# Patient Record
Sex: FEMALE | Race: WHITE | NOT HISPANIC OR LATINO | Employment: FULL TIME | URBAN - METROPOLITAN AREA
[De-identification: names, ages, dates, MRNs, and addresses within clinical notes are randomized per-mention and may not be internally consistent; named-entity substitution may affect disease eponyms.]

---

## 2018-11-15 ENCOUNTER — APPOINTMENT (OUTPATIENT)
Dept: WOMENS IMAGING | Facility: HOSPITAL | Age: 58
End: 2018-11-15

## 2018-11-15 PROCEDURE — 77067 SCR MAMMO BI INCL CAD: CPT | Performed by: RADIOLOGY

## 2019-03-21 ENCOUNTER — OFFICE VISIT (OUTPATIENT)
Dept: GASTROENTEROLOGY | Facility: CLINIC | Age: 59
End: 2019-03-21

## 2019-03-21 VITALS
HEIGHT: 64 IN | TEMPERATURE: 97.8 F | WEIGHT: 154.21 LBS | DIASTOLIC BLOOD PRESSURE: 66 MMHG | BODY MASS INDEX: 26.33 KG/M2 | SYSTOLIC BLOOD PRESSURE: 98 MMHG

## 2019-03-21 DIAGNOSIS — Z12.11 SCREENING FOR COLORECTAL CANCER: ICD-10-CM

## 2019-03-21 DIAGNOSIS — K62.5 RECTAL BLEEDING: Primary | ICD-10-CM

## 2019-03-21 DIAGNOSIS — Z12.12 SCREENING FOR COLORECTAL CANCER: ICD-10-CM

## 2019-03-21 PROCEDURE — 99204 OFFICE O/P NEW MOD 45 MIN: CPT | Performed by: INTERNAL MEDICINE

## 2019-03-21 RX ORDER — ACETAMINOPHEN 500 MG
500 TABLET ORAL EVERY 6 HOURS PRN
COMMUNITY

## 2019-03-21 NOTE — PROGRESS NOTES
Chief Complaint   Patient presents with   • Rectal Bleeding   • Abnormal Lab     postive cologard        Mckenzie Kinsey is a  59 y.o. female here for an initial visit for rectal bleeding and positive [place] guard test    HPI this 59-year-old white female patient of Dr. Ken Delacruz presents after testing positive on [place] guard examination.  She had been advised by her gynecologist at [facility] to undergo more formal examination on the basis of this testing.  She does complain of internal hemorrhoids that have been an issue for the past year and seem to have exacerbated in the recent past.  Her normal bowel pattern is 1 stool per day.  She admits to weight gain of approximately 12 pounds over the past 6 months.  Family history is negative for colorectal cancer.  She denies any significant upper GI complaints.    Past Medical History:   Diagnosis Date   • Fibromyalgia        Current Outpatient Medications   Medication Sig Dispense Refill   • acetaminophen (TYLENOL) 500 MG tablet Take 500 mg by mouth Every 6 (Six) Hours As Needed for Mild Pain .     • IBUPROFEN PO Take 200 mg by mouth 2 (Two) Times a Day As Needed.       No current facility-administered medications for this visit.        PRN Meds:.    No Known Allergies    Social History     Socioeconomic History   • Marital status: Unknown     Spouse name: Not on file   • Number of children: Not on file   • Years of education: Not on file   • Highest education level: Not on file   Tobacco Use   • Smoking status: Current Every Day Smoker   • Smokeless tobacco: Never Used       History reviewed. No pertinent family history.    Review of Systems   Constitutional: Negative for activity change, appetite change, fatigue and unexpected weight change.   HENT: Negative for congestion, facial swelling, sore throat, trouble swallowing and voice change.    Eyes: Negative for photophobia and visual disturbance.   Respiratory: Negative for cough and choking.     Cardiovascular: Negative for chest pain.   Gastrointestinal: Positive for rectal pain. Negative for abdominal distention, abdominal pain, anal bleeding, blood in stool, constipation, diarrhea, nausea and vomiting.   Endocrine: Negative for polyphagia.   Musculoskeletal: Negative for arthralgias, gait problem and joint swelling.   Skin: Negative for color change, pallor and rash.   Allergic/Immunologic: Negative for food allergies.   Neurological: Negative for speech difficulty and headaches.   Hematological: Does not bruise/bleed easily.   Psychiatric/Behavioral: Negative for agitation, confusion and sleep disturbance.       Vitals:    03/21/19 1530   BP: 98/66   Temp: 97.8 °F (36.6 °C)       Physical Exam   Constitutional: She is oriented to person, place, and time. She appears well-developed and well-nourished. No distress.   HENT:   Head: Normocephalic.   Mouth/Throat: Oropharynx is clear and moist. No oropharyngeal exudate.   Eyes: Conjunctivae and EOM are normal. No scleral icterus.   Neck: Normal range of motion. No thyromegaly present.   Cardiovascular: Normal rate and regular rhythm.   No murmur heard.  Pulmonary/Chest: Breath sounds normal. No respiratory distress. She has no wheezes. She has no rales.   Abdominal: Soft. Bowel sounds are normal. She exhibits no distension and no mass. There is no hepatosplenomegaly. There is no tenderness.   Musculoskeletal: Normal range of motion. She exhibits no edema or tenderness.   Lymphadenopathy:     She has no cervical adenopathy.   Neurological: She is alert and oriented to person, place, and time.   Skin: Skin is warm and dry. No rash noted. She is not diaphoretic. No erythema.   Psychiatric: She has a normal mood and affect. Her behavior is normal.   Vitals reviewed.      ASSESSMENT  #1 [place] guard positive: Concerned with either polyps or neoplasia  #2 internal hemorrhoids      PLAN  Schedule colonoscopy  Analpram with hydrocortisone 2.5% cream      ICD-10-CM  ICD-9-CM   1. Rectal bleeding K62.5 569.3

## 2019-03-25 PROBLEM — Z12.11 SCREENING FOR COLORECTAL CANCER: Status: ACTIVE | Noted: 2019-03-25

## 2019-03-25 PROBLEM — Z12.12 SCREENING FOR COLORECTAL CANCER: Status: ACTIVE | Noted: 2019-03-25

## 2020-01-15 ENCOUNTER — OFFICE VISIT (OUTPATIENT)
Dept: FAMILY MEDICINE CLINIC | Facility: CLINIC | Age: 60
End: 2020-01-15

## 2020-01-15 VITALS
HEIGHT: 64 IN | DIASTOLIC BLOOD PRESSURE: 60 MMHG | TEMPERATURE: 98.5 F | HEART RATE: 88 BPM | OXYGEN SATURATION: 99 % | RESPIRATION RATE: 16 BRPM | WEIGHT: 152 LBS | SYSTOLIC BLOOD PRESSURE: 110 MMHG | BODY MASS INDEX: 25.95 KG/M2

## 2020-01-15 DIAGNOSIS — R19.7 DIARRHEA, UNSPECIFIED TYPE: ICD-10-CM

## 2020-01-15 DIAGNOSIS — F41.9 ANXIETY: Primary | ICD-10-CM

## 2020-01-15 DIAGNOSIS — E78.5 DYSLIPIDEMIA: ICD-10-CM

## 2020-01-15 PROBLEM — H93.13 TINNITUS OF BOTH EARS: Status: ACTIVE | Noted: 2020-01-15

## 2020-01-15 PROBLEM — F32.9 MAJOR DEPRESSIVE DISORDER: Status: ACTIVE | Noted: 2020-01-15

## 2020-01-15 LAB
25(OH)D3 SERPL-MCNC: 35.2 NG/ML (ref 30–100)
ALBUMIN SERPL-MCNC: 4 G/DL (ref 3.5–5.2)
ALBUMIN/GLOB SERPL: 1.3 G/DL
ALP SERPL-CCNC: 80 U/L (ref 39–117)
ALT SERPL W P-5'-P-CCNC: 15 U/L (ref 1–33)
ANION GAP SERPL CALCULATED.3IONS-SCNC: 11.4 MMOL/L (ref 5–15)
AST SERPL-CCNC: 22 U/L (ref 1–32)
BILIRUB SERPL-MCNC: <0.2 MG/DL (ref 0.2–1.2)
BUN BLD-MCNC: 9 MG/DL (ref 6–20)
BUN/CREAT SERPL: 11.3 (ref 7–25)
CALCIUM SPEC-SCNC: 9.3 MG/DL (ref 8.6–10.5)
CHLORIDE SERPL-SCNC: 100 MMOL/L (ref 98–107)
CHOLEST SERPL-MCNC: 191 MG/DL (ref 0–200)
CO2 SERPL-SCNC: 24.6 MMOL/L (ref 22–29)
CREAT BLD-MCNC: 0.8 MG/DL (ref 0.57–1)
DEPRECATED RDW RBC AUTO: 43.3 FL (ref 37–54)
ERYTHROCYTE [DISTWIDTH] IN BLOOD BY AUTOMATED COUNT: 12.5 % (ref 12.3–15.4)
FOLATE SERPL-MCNC: >20 NG/ML (ref 4.78–24.2)
GFR SERPL CREATININE-BSD FRML MDRD: 74 ML/MIN/1.73
GLOBULIN UR ELPH-MCNC: 3.1 GM/DL
GLUCOSE BLD-MCNC: 89 MG/DL (ref 65–99)
HCT VFR BLD AUTO: 38.2 % (ref 34–46.6)
HDLC SERPL-MCNC: 40 MG/DL (ref 40–60)
HGB BLD-MCNC: 12.7 G/DL (ref 12–15.9)
LDLC SERPL CALC-MCNC: 108 MG/DL (ref 0–100)
LDLC/HDLC SERPL: 2.71 {RATIO}
MCH RBC QN AUTO: 31.6 PG (ref 26.6–33)
MCHC RBC AUTO-ENTMCNC: 33.2 G/DL (ref 31.5–35.7)
MCV RBC AUTO: 95 FL (ref 79–97)
PLATELET # BLD AUTO: 358 10*3/MM3 (ref 140–450)
PMV BLD AUTO: 9.9 FL (ref 6–12)
POTASSIUM BLD-SCNC: 4.1 MMOL/L (ref 3.5–5.2)
PROT SERPL-MCNC: 7.1 G/DL (ref 6–8.5)
RBC # BLD AUTO: 4.02 10*6/MM3 (ref 3.77–5.28)
SODIUM BLD-SCNC: 136 MMOL/L (ref 136–145)
T-UPTAKE NFR SERPL: 1.16 TBI (ref 0.8–1.3)
T4 SERPL-MCNC: 7.03 MCG/DL (ref 4.5–11.7)
TRIGL SERPL-MCNC: 213 MG/DL (ref 0–150)
TSH SERPL DL<=0.05 MIU/L-ACNC: 4.91 UIU/ML (ref 0.27–4.2)
VIT B12 BLD-MCNC: 620 PG/ML (ref 211–946)
VLDLC SERPL-MCNC: 42.6 MG/DL (ref 5–40)
WBC NRBC COR # BLD: 11.51 10*3/MM3 (ref 3.4–10.8)

## 2020-01-15 PROCEDURE — 84443 ASSAY THYROID STIM HORMONE: CPT | Performed by: INTERNAL MEDICINE

## 2020-01-15 PROCEDURE — 99213 OFFICE O/P EST LOW 20 MIN: CPT | Performed by: INTERNAL MEDICINE

## 2020-01-15 PROCEDURE — 84436 ASSAY OF TOTAL THYROXINE: CPT | Performed by: INTERNAL MEDICINE

## 2020-01-15 PROCEDURE — 36415 COLL VENOUS BLD VENIPUNCTURE: CPT | Performed by: INTERNAL MEDICINE

## 2020-01-15 PROCEDURE — 80053 COMPREHEN METABOLIC PANEL: CPT | Performed by: INTERNAL MEDICINE

## 2020-01-15 PROCEDURE — 80061 LIPID PANEL: CPT | Performed by: INTERNAL MEDICINE

## 2020-01-15 PROCEDURE — 82746 ASSAY OF FOLIC ACID SERUM: CPT | Performed by: INTERNAL MEDICINE

## 2020-01-15 PROCEDURE — 85027 COMPLETE CBC AUTOMATED: CPT | Performed by: INTERNAL MEDICINE

## 2020-01-15 PROCEDURE — 82306 VITAMIN D 25 HYDROXY: CPT | Performed by: INTERNAL MEDICINE

## 2020-01-15 PROCEDURE — 84479 ASSAY OF THYROID (T3 OR T4): CPT | Performed by: INTERNAL MEDICINE

## 2020-01-15 PROCEDURE — 82607 VITAMIN B-12: CPT | Performed by: INTERNAL MEDICINE

## 2020-01-15 RX ORDER — OMEGA-3S/DHA/EPA/FISH OIL/D3 300MG-1000
400 CAPSULE ORAL 2 TIMES DAILY
COMMUNITY
End: 2021-12-09

## 2020-01-15 RX ORDER — DULOXETIN HYDROCHLORIDE 30 MG/1
30 CAPSULE, DELAYED RELEASE ORAL DAILY
Qty: 30 CAPSULE | Refills: 3 | Status: SHIPPED | OUTPATIENT
Start: 2020-01-15 | End: 2020-05-12

## 2020-01-15 RX ORDER — GABAPENTIN 300 MG/1
300 CAPSULE ORAL 3 TIMES DAILY
Qty: 90 CAPSULE | Refills: 3 | Status: SHIPPED | OUTPATIENT
Start: 2020-01-15 | End: 2020-06-12

## 2020-01-16 NOTE — PROGRESS NOTES
Barbara Kinsey is a 59 y.o. female.     History of Present Illness   Patient was seen for anxiety.  Patient has increased amount of stress and was given a list of counselors to consult.  Patient was placed on Cymbalta and asked to follow-up in 1 month if not seeing a psychiatrist.  Patient has diarrhea but was taking magnesium patient was advised to stop.  Patient is not suicidal.    Dictated utilizing Dragon dictation. If there are questions or for further clarification, please contact me.  The following portions of the patient's history were reviewed and updated as appropriate: allergies, current medications, past family history, past medical history, past social history, past surgical history and problem list.    Review of Systems   Constitutional: Negative for fatigue and fever.   HENT: Positive for congestion. Negative for trouble swallowing.    Eyes: Negative for discharge and visual disturbance.   Respiratory: Negative for choking and shortness of breath.    Cardiovascular: Negative for chest pain and palpitations.   Gastrointestinal: Positive for diarrhea. Negative for abdominal pain and blood in stool.   Endocrine: Negative.    Genitourinary: Negative for genital sores and hematuria.   Musculoskeletal: Negative for gait problem and joint swelling.   Skin: Negative for color change, pallor, rash and wound.   Allergic/Immunologic: Positive for environmental allergies. Negative for immunocompromised state.   Neurological: Negative for facial asymmetry and speech difficulty.   Psychiatric/Behavioral: Negative for hallucinations and suicidal ideas.       Objective   Physical Exam   Constitutional: She is oriented to person, place, and time. She appears well-developed and well-nourished.   HENT:   Head: Normocephalic.   Eyes: Pupils are equal, round, and reactive to light. Conjunctivae are normal.   Neck: Normal range of motion. Neck supple.   Cardiovascular: Normal rate, regular rhythm and  normal heart sounds.   Pulmonary/Chest: Effort normal and breath sounds normal.   Abdominal: Soft. Bowel sounds are normal. She exhibits no distension and no mass. There is no tenderness. There is no rebound and no guarding. No hernia.   Musculoskeletal: Normal range of motion.   Neurological: She is alert and oriented to person, place, and time.   Skin: Skin is warm and dry.   Psychiatric: She has a normal mood and affect. Her behavior is normal. Judgment and thought content normal.   Nursing note and vitals reviewed.      Assessment/Plan #1 Cymbalta No. 2 refer to psychiatrist #3 DC magnesium #4 labs  Mckenzie was seen today for annual exam and anxiety.    Diagnoses and all orders for this visit:    Anxiety  -     gabapentin (NEURONTIN) 300 MG capsule; Take 1 capsule by mouth 3 (Three) Times a Day.  -     CBC (No Diff)  -     Comprehensive Metabolic Panel  -     Lipid Panel  -     Vitamin D 25 Hydroxy  -     Vitamin B12 & Folate  -     Thyroid Panel With TSH    Diarrhea, unspecified type  -     gabapentin (NEURONTIN) 300 MG capsule; Take 1 capsule by mouth 3 (Three) Times a Day.  -     CBC (No Diff)  -     Comprehensive Metabolic Panel  -     Lipid Panel  -     Vitamin D 25 Hydroxy  -     Vitamin B12 & Folate  -     Thyroid Panel With TSH    Dyslipidemia  -     gabapentin (NEURONTIN) 300 MG capsule; Take 1 capsule by mouth 3 (Three) Times a Day.  -     CBC (No Diff)  -     Comprehensive Metabolic Panel  -     Lipid Panel  -     Vitamin D 25 Hydroxy  -     Vitamin B12 & Folate  -     Thyroid Panel With TSH    Other orders  -     DULoxetine (CYMBALTA) 30 MG capsule; Take 1 capsule by mouth Daily.

## 2020-01-24 ENCOUNTER — TELEPHONE (OUTPATIENT)
Dept: FAMILY MEDICINE CLINIC | Facility: CLINIC | Age: 60
End: 2020-01-24

## 2020-01-24 NOTE — TELEPHONE ENCOUNTER
TSH is slightly elevated would recheck in 1 month.  WBC also slightly elevated would recheck in 1 month.

## 2020-01-27 ENCOUNTER — OFFICE VISIT (OUTPATIENT)
Dept: GASTROENTEROLOGY | Facility: CLINIC | Age: 60
End: 2020-01-27

## 2020-01-27 VITALS — HEIGHT: 64 IN | WEIGHT: 151 LBS | TEMPERATURE: 98.7 F | BODY MASS INDEX: 25.78 KG/M2

## 2020-01-27 DIAGNOSIS — R19.5 POSITIVE COLORECTAL CANCER SCREENING USING COLOGUARD TEST: ICD-10-CM

## 2020-01-27 DIAGNOSIS — R19.7 DIARRHEA, UNSPECIFIED TYPE: Primary | ICD-10-CM

## 2020-01-27 PROCEDURE — 99214 OFFICE O/P EST MOD 30 MIN: CPT | Performed by: INTERNAL MEDICINE

## 2020-01-27 NOTE — PROGRESS NOTES
Chief Complaint   Patient presents with   • Diarrhea   • Heartburn        Mckenzie Kinsey is a  59 y.o. female here for a follow up visit for diarrhea    HPI this 59-year-old white female patient of Dr. Keon Delacruz was initially seen in March 2019 because of Cologuard positive test and rectal bleeding associated with hemorrhoids.  She was scheduled for colonoscopy but did not complete this.  She presents now with complaints of diarrhea that seem to coincide with her use of magnesium.  Once stopping the magnesium the frequency of bowel movements did reduce but the consistency is been unchanged.  Given her prior history I encouraged her to consider colonoscopic examination.  She is amenable to this.    Past Medical History:   Diagnosis Date   • Anxiety    • Fibromyalgia        Current Outpatient Medications   Medication Sig Dispense Refill   • cholecalciferol (VITAMIN D3) 10 MCG (400 UNIT) tablet Take 400 Units by mouth 2 (Two) Times a Day.     • DULoxetine (CYMBALTA) 30 MG capsule Take 1 capsule by mouth Daily. 30 capsule 3   • gabapentin (NEURONTIN) 300 MG capsule Take 1 capsule by mouth 3 (Three) Times a Day. 90 capsule 3   • IBUPROFEN PO Take 200 mg by mouth 2 (Two) Times a Day As Needed.     • Multiple Vitamin (MULTI VITAMIN DAILY PO) Take  by mouth.     • POTASSIUM PO Take  by mouth Daily.     • acetaminophen (TYLENOL) 500 MG tablet Take 500 mg by mouth Every 6 (Six) Hours As Needed for Mild Pain .     • hydrocortisone (ANUSOL-HC) 2.5 % rectal cream Insert  into the rectum 4 (Four) Times a Day As Needed for Hemorrhoids (rectal discomfort). 30 g 5     No current facility-administered medications for this visit.        PRN Meds:.    No Known Allergies    Social History     Socioeconomic History   • Marital status:      Spouse name: Not on file   • Number of children: Not on file   • Years of education: Not on file   • Highest education level: Not on file   Tobacco Use   • Smoking status: Current  Every Day Smoker     Packs/day: 1.00     Types: Cigarettes     Start date: 1978   • Smokeless tobacco: Never Used   Substance and Sexual Activity   • Alcohol use: Not Currently   • Drug use: Not Currently       No family history on file.    Review of Systems   Constitutional: Negative for activity change, appetite change, fatigue and unexpected weight change.   HENT: Negative for congestion, facial swelling, sore throat, trouble swallowing and voice change.    Eyes: Negative for photophobia and visual disturbance.   Respiratory: Negative for cough and choking.    Cardiovascular: Negative for chest pain.   Gastrointestinal: Positive for anal bleeding and diarrhea. Negative for abdominal distention, abdominal pain, blood in stool, constipation, nausea, rectal pain and vomiting.   Endocrine: Negative for polyphagia.   Musculoskeletal: Negative for arthralgias, gait problem and joint swelling.   Skin: Negative for color change, pallor and rash.   Allergic/Immunologic: Negative for food allergies.   Neurological: Negative for speech difficulty and headaches.   Hematological: Does not bruise/bleed easily.   Psychiatric/Behavioral: Negative for agitation, confusion and sleep disturbance.       Vitals:    01/27/20 1408   Temp: 98.7 °F (37.1 °C)       Physical Exam   Constitutional: She is oriented to person, place, and time. She appears well-developed and well-nourished.   HENT:   Head: Normocephalic.   Mouth/Throat: Oropharynx is clear and moist.   Eyes: Conjunctivae and EOM are normal.   Neck: Normal range of motion.   Cardiovascular: Normal rate and regular rhythm.   Pulmonary/Chest: Breath sounds normal.   Abdominal: Soft. Bowel sounds are normal.   Musculoskeletal: Normal range of motion.   Neurological: She is alert and oriented to person, place, and time.   Skin: Skin is warm and dry.   Psychiatric: She has a normal mood and affect. Her behavior is normal.       ASSESSMENT   #1+ Cologuard test  #2 rectal bleeding  #3  diarrhea      PLAN  Schedule colonoscopy      ICD-10-CM ICD-9-CM   1. Diarrhea, unspecified type R19.7 787.91

## 2020-02-18 ENCOUNTER — LAB (OUTPATIENT)
Dept: FAMILY MEDICINE CLINIC | Facility: CLINIC | Age: 60
End: 2020-02-18

## 2020-02-18 DIAGNOSIS — R79.89 ELEVATED TSH: ICD-10-CM

## 2020-02-18 DIAGNOSIS — D72.829 LEUKOCYTOSIS, UNSPECIFIED TYPE: Primary | ICD-10-CM

## 2020-02-18 LAB
ERYTHROCYTE [DISTWIDTH] IN BLOOD BY AUTOMATED COUNT: 13 % (ref 12.3–15.4)
HCT VFR BLD AUTO: 37.9 % (ref 34–46.6)
HGB BLD-MCNC: 12.6 G/DL (ref 12–15.9)
LYMPHOCYTES # BLD AUTO: 3.7 10*3/MM3 (ref 0.7–3.1)
LYMPHOCYTES NFR BLD AUTO: 34.8 % (ref 19.6–45.3)
MCH RBC QN AUTO: 32.1 PG (ref 26.6–33)
MCHC RBC AUTO-ENTMCNC: 33.2 G/DL (ref 31.5–35.7)
MCV RBC AUTO: 96.7 FL (ref 79–97)
MONOCYTES # BLD AUTO: 0.6 10*3/MM3 (ref 0.1–0.9)
MONOCYTES NFR BLD AUTO: 5.4 % (ref 5–12)
NEUTROPHILS # BLD AUTO: 6.3 10*3/MM3 (ref 1.7–7)
NEUTROPHILS NFR BLD AUTO: 59.8 % (ref 42.7–76)
PLATELET # BLD AUTO: 381 10*3/MM3 (ref 140–450)
PMV BLD AUTO: 6.6 FL (ref 6–12)
RBC # BLD AUTO: 3.92 10*6/MM3 (ref 3.77–5.28)
TSH SERPL DL<=0.05 MIU/L-ACNC: 5.95 UIU/ML (ref 0.27–4.2)
WBC NRBC COR # BLD: 10.5 10*3/MM3 (ref 3.4–10.8)

## 2020-02-18 PROCEDURE — 84443 ASSAY THYROID STIM HORMONE: CPT | Performed by: INTERNAL MEDICINE

## 2020-02-18 PROCEDURE — 85025 COMPLETE CBC W/AUTO DIFF WBC: CPT | Performed by: INTERNAL MEDICINE

## 2020-02-18 PROCEDURE — 36415 COLL VENOUS BLD VENIPUNCTURE: CPT | Performed by: INTERNAL MEDICINE

## 2020-02-19 ENCOUNTER — DOCUMENTATION (OUTPATIENT)
Dept: FAMILY MEDICINE CLINIC | Facility: CLINIC | Age: 60
End: 2020-02-19

## 2020-02-19 DIAGNOSIS — E03.8 HYPOTHYROIDISM DUE TO HASHIMOTO'S THYROIDITIS: Primary | ICD-10-CM

## 2020-02-19 DIAGNOSIS — E06.3 HYPOTHYROIDISM DUE TO HASHIMOTO'S THYROIDITIS: Primary | ICD-10-CM

## 2020-02-19 RX ORDER — LEVOTHYROXINE SODIUM 0.05 MG/1
50 TABLET ORAL DAILY
Qty: 30 TABLET | Refills: 3 | Status: SHIPPED | OUTPATIENT
Start: 2020-02-19 | End: 2020-06-23

## 2020-02-24 ENCOUNTER — TELEPHONE (OUTPATIENT)
Dept: FAMILY MEDICINE CLINIC | Facility: CLINIC | Age: 60
End: 2020-02-24

## 2020-03-19 ENCOUNTER — ANESTHESIA EVENT (OUTPATIENT)
Dept: GASTROENTEROLOGY | Facility: HOSPITAL | Age: 60
End: 2020-03-19

## 2020-03-19 ENCOUNTER — ANESTHESIA (OUTPATIENT)
Dept: GASTROENTEROLOGY | Facility: HOSPITAL | Age: 60
End: 2020-03-19

## 2020-03-19 ENCOUNTER — HOSPITAL ENCOUNTER (OUTPATIENT)
Facility: HOSPITAL | Age: 60
Setting detail: HOSPITAL OUTPATIENT SURGERY
Discharge: HOME | End: 2020-03-19
Attending: INTERNAL MEDICINE | Admitting: INTERNAL MEDICINE

## 2020-03-19 VITALS
DIASTOLIC BLOOD PRESSURE: 51 MMHG | OXYGEN SATURATION: 98 % | TEMPERATURE: 98.2 F | RESPIRATION RATE: 12 BRPM | HEART RATE: 72 BPM | HEIGHT: 64 IN | BODY MASS INDEX: 25.61 KG/M2 | WEIGHT: 150 LBS | SYSTOLIC BLOOD PRESSURE: 91 MMHG

## 2020-03-19 DIAGNOSIS — R19.7 DIARRHEA, UNSPECIFIED TYPE: ICD-10-CM

## 2020-03-19 DIAGNOSIS — R19.5 POSITIVE COLORECTAL CANCER SCREENING USING COLOGUARD TEST: ICD-10-CM

## 2020-03-19 PROCEDURE — 45380 COLONOSCOPY AND BIOPSY: CPT | Performed by: INTERNAL MEDICINE

## 2020-03-19 PROCEDURE — S0260 H&P FOR SURGERY: HCPCS | Performed by: INTERNAL MEDICINE

## 2020-03-19 PROCEDURE — 25010000002 PROPOFOL 10 MG/ML EMULSION: Performed by: NURSE ANESTHETIST, CERTIFIED REGISTERED

## 2020-03-19 PROCEDURE — 45385 COLONOSCOPY W/LESION REMOVAL: CPT | Performed by: INTERNAL MEDICINE

## 2020-03-19 PROCEDURE — 88305 TISSUE EXAM BY PATHOLOGIST: CPT | Performed by: INTERNAL MEDICINE

## 2020-03-19 RX ORDER — PROPOFOL 10 MG/ML
VIAL (ML) INTRAVENOUS CONTINUOUS PRN
Status: DISCONTINUED | OUTPATIENT
Start: 2020-03-19 | End: 2020-03-19 | Stop reason: SURG

## 2020-03-19 RX ORDER — SODIUM CHLORIDE 0.9 % (FLUSH) 0.9 %
10 SYRINGE (ML) INJECTION AS NEEDED
Status: DISCONTINUED | OUTPATIENT
Start: 2020-03-19 | End: 2020-03-19 | Stop reason: HOSPADM

## 2020-03-19 RX ORDER — LIDOCAINE HYDROCHLORIDE 20 MG/ML
INJECTION, SOLUTION INFILTRATION; PERINEURAL AS NEEDED
Status: DISCONTINUED | OUTPATIENT
Start: 2020-03-19 | End: 2020-03-19 | Stop reason: SURG

## 2020-03-19 RX ORDER — PROPOFOL 10 MG/ML
VIAL (ML) INTRAVENOUS AS NEEDED
Status: DISCONTINUED | OUTPATIENT
Start: 2020-03-19 | End: 2020-03-19 | Stop reason: SURG

## 2020-03-19 RX ORDER — SODIUM CHLORIDE, SODIUM LACTATE, POTASSIUM CHLORIDE, CALCIUM CHLORIDE 600; 310; 30; 20 MG/100ML; MG/100ML; MG/100ML; MG/100ML
30 INJECTION, SOLUTION INTRAVENOUS CONTINUOUS PRN
Status: DISCONTINUED | OUTPATIENT
Start: 2020-03-19 | End: 2020-03-19 | Stop reason: HOSPADM

## 2020-03-19 RX ORDER — SODIUM CHLORIDE 0.9 % (FLUSH) 0.9 %
3 SYRINGE (ML) INJECTION EVERY 12 HOURS SCHEDULED
Status: DISCONTINUED | OUTPATIENT
Start: 2020-03-19 | End: 2020-03-19 | Stop reason: HOSPADM

## 2020-03-19 RX ADMIN — PROPOFOL 48 MG: 10 INJECTION, EMULSION INTRAVENOUS at 08:12

## 2020-03-19 RX ADMIN — LIDOCAINE HYDROCHLORIDE 60 MG: 20 INJECTION, SOLUTION INFILTRATION; PERINEURAL at 08:09

## 2020-03-19 RX ADMIN — PROPOFOL 200 MCG/KG/MIN: 10 INJECTION, EMULSION INTRAVENOUS at 08:09

## 2020-03-19 RX ADMIN — SODIUM CHLORIDE, POTASSIUM CHLORIDE, SODIUM LACTATE AND CALCIUM CHLORIDE 30 ML/HR: 600; 310; 30; 20 INJECTION, SOLUTION INTRAVENOUS at 07:46

## 2020-03-19 RX ADMIN — PROPOFOL 50 MG: 10 INJECTION, EMULSION INTRAVENOUS at 08:10

## 2020-03-19 NOTE — ANESTHESIA PREPROCEDURE EVALUATION
Anesthesia Evaluation     Patient summary reviewed and Nursing notes reviewed   no history of anesthetic complications:  NPO Solid Status: > 8 hours  NPO Liquid Status: > 4 hours           Airway   Mallampati: II  TM distance: >3 FB  Neck ROM: full  No difficulty expected  Dental - normal exam     Comment: Missing side teeth    Pulmonary - normal exam    breath sounds clear to auscultation  (+) a smoker Current Smoked day of surgery,   Cardiovascular - negative cardio ROS and normal exam    Rhythm: regular  Rate: normal        Neuro/Psych  (+) psychiatric history Anxiety and Depression,     GI/Hepatic/Renal/Endo    (+)   thyroid problem hypothyroidism    Musculoskeletal     (+) chronic pain, myalgias,   Abdominal  - normal exam   Substance History - negative use     OB/GYN negative ob/gyn ROS         Other - negative ROS                     Anesthesia Plan    ASA 2     MAC     intravenous induction     Anesthetic plan, all risks, benefits, and alternatives have been provided, discussed and informed consent has been obtained with: patient.

## 2020-03-19 NOTE — ANESTHESIA POSTPROCEDURE EVALUATION
Patient: Mckenzie Kinsey    Procedure Summary     Date:  03/19/20 Room / Location:  [facility] ENDOSCOPY 8 /  [facility]    Anesthesia Start:  0806 Anesthesia Stop:  0843    Procedure:  COLONOSCOPY to cecum and TI with cold snare polypectomies (N/A ) Diagnosis:       Colon polyps      Hemorrhoids      (Diarrhea, unspecified type [R19.7])      (Positive colorectal cancer screening using Cologuard test [R19.5])    Surgeon:  Jm Choe MD Provider:  Julissa Navarro MD    Anesthesia Type:  MAC ASA Status:  2          Anesthesia Type: MAC    Vitals  Vitals Value Taken Time   BP 91/51 3/19/2020  9:01 AM   Temp 36.8 °C (98.2 °F) 3/19/2020  8:45 AM   Pulse 72 3/19/2020  9:01 AM   Resp 12 3/19/2020  9:01 AM   SpO2 98 % 3/19/2020  9:01 AM           Post Anesthesia Care and Evaluation    Patient location during evaluation: PACU  Patient participation: complete - patient participated  Level of consciousness: awake and alert  Pain management: adequate  Airway patency: patent  Anesthetic complications: No anesthetic complications  PONV Status: none  Cardiovascular status: acceptable  Respiratory status: acceptable  Hydration status: acceptable

## 2020-03-19 NOTE — H&P
[facility]  Pre Procedure History & Physical    Chief Complaint:   Positive Cologuard testing, rectal bleeding, diarrhea    Subjective     HPI:   This 60-year-old female presents the endoscopy suite for colonoscopic evaluation.  She had tested positive for Cologuard testing and also notes rectal bleeding and diarrhea.  No prior colonoscopy of record.    Past Medical History:   Past Medical History:   Diagnosis Date   • Anxiety    • Chronic pain    • Disease of thyroid gland     HYPOTHYROIDISM   • Fibromyalgia    • Loose stools    • Positive colorectal cancer screening using Cologuard test        Past Surgical History:  Past Surgical History:   Procedure Laterality Date   •  SECTION     • HYSTERECTOMY     • SPINE SURGERY         Family History:  Family History   Problem Relation Age of Onset   • Malig Hyperthermia Neg Hx        Social History:   reports that she has been smoking cigarettes. She started smoking about 42 years ago. She has been smoking about 1.00 pack per day. She has never used smokeless tobacco. She reports that she drank alcohol. She reports that she has current or past drug history.    Medications:   Medications Prior to Admission   Medication Sig Dispense Refill Last Dose   • acetaminophen (TYLENOL) 500 MG tablet Take 500 mg by mouth Every 6 (Six) Hours As Needed for Mild Pain .   3/18/2020 at Unknown time   • cholecalciferol (VITAMIN D3) 10 MCG (400 UNIT) tablet Take 400 Units by mouth 2 (Two) Times a Day.   Past Week at Unknown time   • DULoxetine (CYMBALTA) 30 MG capsule Take 1 capsule by mouth Daily. 30 capsule 3 3/18/2020 at Unknown time   • gabapentin (NEURONTIN) 300 MG capsule Take 1 capsule by mouth 3 (Three) Times a Day. 90 capsule 3 3/18/2020 at Unknown time   • hydrocortisone (ANUSOL-HC) 2.5 % rectal cream Insert  into the rectum 4 (Four) Times a Day As Needed for Hemorrhoids (rectal discomfort). 30 g 5 Not Taking   • levothyroxine (SYNTHROID) 50 MCG  "tablet Take 1 tablet by mouth Daily. 30 tablet 3 3/18/2020 at Unknown time   • Multiple Vitamin (MULTI VITAMIN DAILY PO) Take 1 tablet by mouth Daily.   Past Week at Unknown time   • POTASSIUM PO Take 200 mg by mouth Daily.   3/18/2020 at Unknown time       Allergies:  Patient has no known allergies.    ROS:    Pertinent items are noted in HPI, all other systems reviewed and negative     Objective     Blood pressure 92/51, pulse 73, temperature 98.1 °F (36.7 °C), temperature source Oral, resp. rate 18, height 162.6 cm (64\"), weight 68 kg (150 lb), SpO2 96 %.    Physical Exam   Constitutional: Pt is oriented to person, place, and time and well-developed, well-nourished, and in no distress.   Mouth/Throat: Oropharynx is clear and moist.   Neck: Normal range of motion.   Cardiovascular: Normal rate, regular rhythm and normal heart sounds.    Pulmonary/Chest: Effort normal and breath sounds normal.   Abdominal: Soft. Nontender  Skin: Skin is warm and dry.   Psychiatric: Mood, memory, affect and judgment normal.     Assessment/Plan     Diagnosis:  Cologuard positive  Rectal bleeding  Diarrhea    Anticipated Surgical Procedure:  Colonoscopy    The risks, benefits, and alternatives of this procedure have been discussed with the patient or the responsible party- the patient understands and agrees to proceed.                                                          "

## 2020-03-20 LAB
CYTO UR: NORMAL
LAB AP CASE REPORT: NORMAL
LAB AP CLINICAL INFORMATION: NORMAL
PATH REPORT.FINAL DX SPEC: NORMAL
PATH REPORT.GROSS SPEC: NORMAL

## 2020-03-23 ENCOUNTER — TELEPHONE (OUTPATIENT)
Dept: GASTROENTEROLOGY | Facility: CLINIC | Age: 60
End: 2020-03-23

## 2020-03-23 NOTE — TELEPHONE ENCOUNTER
----- Message from Jm LEONG MD sent at 3/20/2020  5:17 PM EDT -----  Regarding: Biopsy results  Okay to call results, essentially everything negative.  Can follow-up to discuss other work-up for her diarrhea including malabsorption with small bowel assessment.  ----- Message -----  From: Lab, Background User  Sent: 3/20/2020  12:01 PM EDT  To: Jm LEONG MD

## 2020-03-25 ENCOUNTER — TELEPHONE (OUTPATIENT)
Dept: FAMILY MEDICINE CLINIC | Facility: CLINIC | Age: 60
End: 2020-03-25

## 2020-03-25 ENCOUNTER — OFFICE VISIT (OUTPATIENT)
Dept: FAMILY MEDICINE CLINIC | Facility: CLINIC | Age: 60
End: 2020-03-25

## 2020-03-25 VITALS
DIASTOLIC BLOOD PRESSURE: 58 MMHG | HEART RATE: 78 BPM | BODY MASS INDEX: 25.95 KG/M2 | HEIGHT: 64 IN | RESPIRATION RATE: 16 BRPM | OXYGEN SATURATION: 97 % | TEMPERATURE: 98.2 F | SYSTOLIC BLOOD PRESSURE: 98 MMHG | WEIGHT: 152 LBS

## 2020-03-25 DIAGNOSIS — R05.9 COUGH: Primary | ICD-10-CM

## 2020-03-25 DIAGNOSIS — J02.9 PHARYNGITIS, UNSPECIFIED ETIOLOGY: ICD-10-CM

## 2020-03-25 DIAGNOSIS — R06.2 WHEEZING: ICD-10-CM

## 2020-03-25 PROBLEM — Z86.010 HISTORY OF COLONIC POLYPS: Status: ACTIVE | Noted: 2020-03-25

## 2020-03-25 PROCEDURE — 99213 OFFICE O/P EST LOW 20 MIN: CPT | Performed by: INTERNAL MEDICINE

## 2020-03-25 RX ORDER — ALBUTEROL SULFATE 90 UG/1
2 AEROSOL, METERED RESPIRATORY (INHALATION) EVERY 4 HOURS PRN
Qty: 1 INHALER | Refills: 0 | Status: SHIPPED | OUTPATIENT
Start: 2020-03-25 | End: 2020-03-25

## 2020-03-25 RX ORDER — ALBUTEROL SULFATE 90 UG/1
AEROSOL, METERED RESPIRATORY (INHALATION)
Qty: 42.5 G | Refills: 3 | Status: SHIPPED | OUTPATIENT
Start: 2020-03-25

## 2020-03-25 RX ORDER — AZITHROMYCIN 250 MG/1
TABLET, FILM COATED ORAL
Qty: 6 TABLET | Refills: 0 | Status: SHIPPED | OUTPATIENT
Start: 2020-03-25 | End: 2020-06-04

## 2020-03-25 RX ORDER — INHALER, ASSIST DEVICES
1 SPACER (EA) MISCELLANEOUS 4 TIMES DAILY PRN
Qty: 1 EACH | Refills: 1 | Status: SHIPPED | OUTPATIENT
Start: 2020-03-25 | End: 2020-06-04

## 2020-03-25 NOTE — PROGRESS NOTES
Barbara Kinsey is a 60 y.o. female.     History of Present Illness   Patient did develop a greenish productive cough.  She is watching her grandkids and developed a cough 4 days ago.  There is an occasional wheeze and she also has developed pharyngitis.  Patient does have body aches but does not have a fever.  Patient's exam is consistent with sinusitis and a Z-Manuel with inhaler was given with a spacer.  Patient will follow-up in 4 days if not better.    Dictated utilizing Dragon dictation. If there are questions or for further clarification, please contact me.  The following portions of the patient's history were reviewed and updated as appropriate: allergies, current medications, past family history, past medical history, past social history, past surgical history and problem list.    Review of Systems   Constitutional: Negative for fatigue and fever.   HENT: Positive for congestion, sinus pain and sore throat. Negative for trouble swallowing.    Eyes: Negative for discharge and visual disturbance.   Respiratory: Positive for cough and wheezing. Negative for choking and shortness of breath.    Cardiovascular: Negative for chest pain and palpitations.   Gastrointestinal: Negative for abdominal pain and blood in stool.   Endocrine: Negative.    Genitourinary: Negative for genital sores and hematuria.   Musculoskeletal: Negative for gait problem and joint swelling.   Skin: Negative for color change, pallor, rash and wound.   Allergic/Immunologic: Positive for environmental allergies. Negative for immunocompromised state.   Neurological: Negative for facial asymmetry and speech difficulty.   Psychiatric/Behavioral: Negative for hallucinations and suicidal ideas.       Objective   Physical Exam   Constitutional: She is oriented to person, place, and time. She appears well-developed and well-nourished.   HENT:   Head: Normocephalic.   Eyes: Pupils are equal, round, and reactive to light. Conjunctivae are  normal.   Neck: Normal range of motion. Neck supple.   Cardiovascular: Normal rate, regular rhythm and normal heart sounds.   Pulmonary/Chest: Effort normal and breath sounds normal. No stridor. No respiratory distress. She has no wheezes. She has no rales. She exhibits no tenderness.   Abdominal: Soft. Bowel sounds are normal.   Musculoskeletal: Normal range of motion.   Neurological: She is alert and oriented to person, place, and time.   Skin: Skin is warm and dry.   Psychiatric: She has a normal mood and affect. Her behavior is normal. Judgment and thought content normal.   Nursing note and vitals reviewed.      Assessment/Plan #1 Z-Manuel #2 Proventil with spacer  Mckenzie was seen today for generalized body aches, sore throat, cough and earache.    Diagnoses and all orders for this visit:    Cough    Wheezing    Pharyngitis, unspecified etiology    Other orders  -     azithromycin (Zithromax) 250 MG tablet; Take 2 tablets the first day, then 1 tablet daily for 4 days.  -     albuterol sulfate  (90 Base) MCG/ACT inhaler; Inhale 2 puffs Every 4 (Four) Hours As Needed for Wheezing.  -     Spacer/Aero-Holding Chambers (POCKET SPACER) device; 1 Units 4 (Four) Times a Day As Needed (wheezing).

## 2020-03-25 NOTE — TELEPHONE ENCOUNTER
PATIENT CALLED IN REGARDS TO HER NOT FEELING WELL SHE STATES THAT SHE HAS A SORE THROAT , COUGHING WITH A RUNNY NOSE AND HAS A SCHEDULED APPOINTMENT FOR THIS Friday 03/27/2020 BUT SHE WOULD LIKE TO SEE IF SHE CAN GET SOMETHING CALLED INTO HER PHARMACY  ([facility]) AND ALSO COULD SHE GET A WORK EXCUSE AS SHE HAS CALLED OFF TODAY. PLEASE ADVISE AND CALL THE PATIENT BACK [phone].

## 2020-04-01 ENCOUNTER — TELEPHONE (OUTPATIENT)
Dept: GASTROENTEROLOGY | Facility: CLINIC | Age: 60
End: 2020-04-01

## 2020-04-01 NOTE — TELEPHONE ENCOUNTER
call back- path results   Received: Today   Message Contents   [facility] [place] [facility] Pool   Phone Number: [phone]     **Call to pt.  Advise per Dr Choe note of 3/20.     Verb understanding.  States since stopping magnesium, not having problems with diarrhea.   Will call back as needed.

## 2020-05-12 RX ORDER — DULOXETIN HYDROCHLORIDE 30 MG/1
30 CAPSULE, DELAYED RELEASE ORAL DAILY
Qty: 30 CAPSULE | Refills: 3 | Status: SHIPPED | OUTPATIENT
Start: 2020-05-12 | End: 2020-09-08

## 2020-06-04 ENCOUNTER — OFFICE VISIT (OUTPATIENT)
Dept: FAMILY MEDICINE CLINIC | Facility: CLINIC | Age: 60
End: 2020-06-04

## 2020-06-04 VITALS
OXYGEN SATURATION: 99 % | BODY MASS INDEX: 25.64 KG/M2 | HEART RATE: 72 BPM | HEIGHT: 64 IN | DIASTOLIC BLOOD PRESSURE: 60 MMHG | WEIGHT: 150.2 LBS | TEMPERATURE: 98.2 F | SYSTOLIC BLOOD PRESSURE: 100 MMHG

## 2020-06-04 DIAGNOSIS — L98.9 SKIN LESIONS: ICD-10-CM

## 2020-06-04 DIAGNOSIS — R53.83 FATIGUE, UNSPECIFIED TYPE: ICD-10-CM

## 2020-06-04 DIAGNOSIS — M54.2 NECK DISCOMFORT: Primary | ICD-10-CM

## 2020-06-04 DIAGNOSIS — R79.89 ABNORMAL TSH: ICD-10-CM

## 2020-06-04 DIAGNOSIS — J30.9 ALLERGIC RHINITIS, UNSPECIFIED SEASONALITY, UNSPECIFIED TRIGGER: ICD-10-CM

## 2020-06-04 DIAGNOSIS — E04.9 ENLARGED THYROID: ICD-10-CM

## 2020-06-04 PROCEDURE — 99214 OFFICE O/P EST MOD 30 MIN: CPT | Performed by: INTERNAL MEDICINE

## 2020-06-04 RX ORDER — MONTELUKAST SODIUM 10 MG/1
TABLET ORAL
Qty: 30 TABLET | Refills: 11 | Status: SHIPPED | OUTPATIENT
Start: 2020-06-04 | End: 2021-06-01 | Stop reason: SDUPTHER

## 2020-06-04 NOTE — PROGRESS NOTES
Subjective   Mckenzie Kinesy is a 60 y.o. female.  Patient here for several reasons follow-up on thyroid put taking thyroid about 6 days ago not sure she needed that she did have only mildly elevated TSH has been having fatigue for 6 months  Body mass index is 25.78 kg/m².  History of Present Illness   Fatigue of 6 months duration has been taking thyroid is complaining about pain in her glands of her neck she does not have increased cervical nodes but is mild discomfort over the thyroid gland with enlarged thyroid is having ongoing fatigue sinus trouble discomfort in her ears think she has allergies cannot define with is more be year-round a very finite time span will let patient try Singulair for this to see if this helps in addition to a as needed antihistamine over-the-counter.  Chronic cold naturedness fatigue more over 6 months duration no known sleep apnea nor is she a loud snorer.  She is current with her colon screenings had what about 6 months ago to space she has had every 5 years but can check with her specialist.  Some pressure in her ears no increased temperature no cough no sputum does have several skin lesions that she will need to have looked at as well.  Has seen  before.  Drug allergies are none.  Current smoker.  No contributory family history..  Patient with ongoing leg discomfort requesting Voltaren gel which we will prescribe biases predominately for knees and other joints, is over-the-counter now but she thinks she will save money if she gets a separate prescription.  Review of Systems   HENT: Positive for sinus pressure and sinus pain.    Neurological: Positive for headaches.   All other systems reviewed and are negative.      Objective   Vitals:    06/04/20 1550   BP: 100/60   Pulse: 72   Temp: 98.2 °F (36.8 °C)   SpO2: 99%   Weight: 68.1 kg (150 lb 3.2 oz)     Physical Exam   Constitutional: She appears well-developed and well-nourished.   HENT:   Head: Normocephalic and  atraumatic.   Mouth/Throat: Oropharynx is clear and moist.   Minimal retraction of TMs.   Eyes: Pupils are equal, round, and reactive to light. Conjunctivae are normal.   Neck:   Thyroid is mildly sore to touch slight more to the right of midline.  Prominent left thyroid lobe both upper and lower right thyroid perhaps minimally prominent right lower lobe.  No stridor were neck trachea is midline.   Cardiovascular: Normal rate, regular rhythm and normal heart sounds.   Pulmonary/Chest: Effort normal and breath sounds normal.   Abdominal: Soft. Bowel sounds are normal.   Neurological: She is alert.   Unremarkable gait and station   Skin: Skin is warm and dry.   Darkish lesion left anterior thigh also left upper arm increased pigmentation on both those may see dermatologist for these.   Nursing note and vitals reviewed.      No results found for: INR    Procedures    Assessment/Plan   1.  Neck discomfort    2.  Enlarged thyroid plan thyroid ultrasound    3.  Normal TSH get a TSH free T3 and free T4    4.  Fatigue await pending lab    5.  Skin lesions patient see dermatologist  has seen Dr. rahman will have patient scheduled with him a list of dermatologist given to patient.    6.  Allergic rhinitis try over-the-counter antihistamine will send a prescription for Singulair for this patient let us know how she is doing after we have lab work back if all her lab test for fatigue were normal still with fatigue we will get a formal sleep apnea evaluation.    Much of this encounter note is an electronic transcription/translation of spoken language to printed text.  The electronic translation of spoken language may permit erroneous, or at times, nonsensical words or phrases to be inadvertently transcribed.  Although I have reviewed the note for such errors, some may still exist. If there are questions or for further clarification, please contact me.

## 2020-06-09 ENCOUNTER — LAB (OUTPATIENT)
Dept: FAMILY MEDICINE CLINIC | Facility: CLINIC | Age: 60
End: 2020-06-09

## 2020-06-09 LAB
ALBUMIN SERPL-MCNC: 4 G/DL (ref 3.5–5.2)
ALBUMIN/GLOB SERPL: 1.5 G/DL
ALP SERPL-CCNC: 73 U/L (ref 39–117)
ALT SERPL W P-5'-P-CCNC: 10 U/L (ref 1–33)
ANION GAP SERPL CALCULATED.3IONS-SCNC: 12.8 MMOL/L (ref 5–15)
AST SERPL-CCNC: 23 U/L (ref 1–32)
BILIRUB SERPL-MCNC: 0.2 MG/DL (ref 0.2–1.2)
BUN BLD-MCNC: 7 MG/DL (ref 6–20)
BUN/CREAT SERPL: 9.7 (ref 7–25)
CALCIUM SPEC-SCNC: 9.3 MG/DL (ref 8.6–10.5)
CHLORIDE SERPL-SCNC: 101 MMOL/L (ref 98–107)
CO2 SERPL-SCNC: 24.2 MMOL/L (ref 22–29)
CREAT BLD-MCNC: 0.72 MG/DL (ref 0.57–1)
ERYTHROCYTE [DISTWIDTH] IN BLOOD BY AUTOMATED COUNT: 13.1 % (ref 12.3–15.4)
GFR SERPL CREATININE-BSD FRML MDRD: 83 ML/MIN/1.73
GLOBULIN UR ELPH-MCNC: 2.7 GM/DL
GLUCOSE BLD-MCNC: 84 MG/DL (ref 65–99)
HCT VFR BLD AUTO: 37.2 % (ref 34–46.6)
HGB BLD-MCNC: 12.7 G/DL (ref 12–15.9)
LYMPHOCYTES # BLD AUTO: 2.8 10*3/MM3 (ref 0.7–3.1)
LYMPHOCYTES NFR BLD AUTO: 30.9 % (ref 19.6–45.3)
MCH RBC QN AUTO: 32.6 PG (ref 26.6–33)
MCHC RBC AUTO-ENTMCNC: 34 G/DL (ref 31.5–35.7)
MCV RBC AUTO: 95.9 FL (ref 79–97)
MONOCYTES # BLD AUTO: 0.5 10*3/MM3 (ref 0.1–0.9)
MONOCYTES NFR BLD AUTO: 5.8 % (ref 5–12)
NEUTROPHILS # BLD AUTO: 5.8 10*3/MM3 (ref 1.7–7)
NEUTROPHILS NFR BLD AUTO: 63.3 % (ref 42.7–76)
PLATELET # BLD AUTO: 344 10*3/MM3 (ref 140–450)
PMV BLD AUTO: 6.9 FL (ref 6–12)
POTASSIUM BLD-SCNC: 4 MMOL/L (ref 3.5–5.2)
PROT SERPL-MCNC: 6.7 G/DL (ref 6–8.5)
RBC # BLD AUTO: 3.88 10*6/MM3 (ref 3.77–5.28)
SODIUM BLD-SCNC: 138 MMOL/L (ref 136–145)
T3FREE SERPL-MCNC: 3.01 PG/ML (ref 2–4.4)
T4 FREE SERPL-MCNC: 1.08 NG/DL (ref 0.93–1.7)
TSH SERPL DL<=0.05 MIU/L-ACNC: 4 UIU/ML (ref 0.27–4.2)
WBC NRBC COR # BLD: 9.2 10*3/MM3 (ref 3.4–10.8)

## 2020-06-09 PROCEDURE — 85025 COMPLETE CBC W/AUTO DIFF WBC: CPT | Performed by: INTERNAL MEDICINE

## 2020-06-09 PROCEDURE — 80053 COMPREHEN METABOLIC PANEL: CPT | Performed by: INTERNAL MEDICINE

## 2020-06-09 PROCEDURE — 84481 FREE ASSAY (FT-3): CPT | Performed by: INTERNAL MEDICINE

## 2020-06-09 PROCEDURE — 84443 ASSAY THYROID STIM HORMONE: CPT | Performed by: INTERNAL MEDICINE

## 2020-06-09 PROCEDURE — 84439 ASSAY OF FREE THYROXINE: CPT | Performed by: INTERNAL MEDICINE

## 2020-06-09 PROCEDURE — 36415 COLL VENOUS BLD VENIPUNCTURE: CPT | Performed by: INTERNAL MEDICINE

## 2020-06-12 DIAGNOSIS — E78.5 DYSLIPIDEMIA: ICD-10-CM

## 2020-06-12 DIAGNOSIS — R19.7 DIARRHEA, UNSPECIFIED TYPE: ICD-10-CM

## 2020-06-12 DIAGNOSIS — F41.9 ANXIETY: ICD-10-CM

## 2020-06-12 RX ORDER — GABAPENTIN 300 MG/1
CAPSULE ORAL
Qty: 90 CAPSULE | Refills: 3 | Status: SHIPPED | OUTPATIENT
Start: 2020-06-12 | End: 2020-12-23

## 2020-06-22 ENCOUNTER — HOSPITAL ENCOUNTER (OUTPATIENT)
Dept: ULTRASOUND IMAGING | Facility: HOSPITAL | Age: 60
Discharge: HOME | End: 2020-06-22
Admitting: INTERNAL MEDICINE

## 2020-06-22 PROCEDURE — 76536 US EXAM OF HEAD AND NECK: CPT

## 2020-06-23 RX ORDER — LEVOTHYROXINE SODIUM 0.05 MG/1
50 TABLET ORAL DAILY
Qty: 30 TABLET | Refills: 3 | Status: SHIPPED | OUTPATIENT
Start: 2020-06-23 | End: 2020-11-02 | Stop reason: SDUPTHER

## 2020-09-08 RX ORDER — DULOXETIN HYDROCHLORIDE 30 MG/1
30 CAPSULE, DELAYED RELEASE ORAL DAILY
Qty: 30 CAPSULE | Refills: 3 | Status: SHIPPED | OUTPATIENT
Start: 2020-09-08 | End: 2020-10-23

## 2020-10-06 ENCOUNTER — OFFICE VISIT (OUTPATIENT)
Dept: FAMILY MEDICINE CLINIC | Facility: CLINIC | Age: 60
End: 2020-10-06

## 2020-10-06 VITALS
HEIGHT: 64 IN | BODY MASS INDEX: 25.37 KG/M2 | HEART RATE: 80 BPM | DIASTOLIC BLOOD PRESSURE: 64 MMHG | TEMPERATURE: 97.3 F | OXYGEN SATURATION: 98 % | SYSTOLIC BLOOD PRESSURE: 104 MMHG | WEIGHT: 148.6 LBS

## 2020-10-06 DIAGNOSIS — E03.9 HYPOTHYROIDISM, UNSPECIFIED TYPE: Primary | ICD-10-CM

## 2020-10-06 LAB — TSH SERPL DL<=0.05 MIU/L-ACNC: 1.64 UIU/ML (ref 0.27–4.2)

## 2020-10-06 PROCEDURE — 84481 FREE ASSAY (FT-3): CPT | Performed by: INTERNAL MEDICINE

## 2020-10-06 PROCEDURE — 36415 COLL VENOUS BLD VENIPUNCTURE: CPT | Performed by: INTERNAL MEDICINE

## 2020-10-06 PROCEDURE — 84439 ASSAY OF FREE THYROXINE: CPT | Performed by: INTERNAL MEDICINE

## 2020-10-06 PROCEDURE — 99213 OFFICE O/P EST LOW 20 MIN: CPT | Performed by: INTERNAL MEDICINE

## 2020-10-06 PROCEDURE — 84443 ASSAY THYROID STIM HORMONE: CPT | Performed by: INTERNAL MEDICINE

## 2020-10-06 NOTE — PROGRESS NOTES
Subjective   Mckenzie Kinsey is a 60 y.o. female.  Here for follow-up on TSH just been on thyroid medicine recently  Body mass index is 25.51 kg/m².  History of Present Illness   Hypothyroidism adjustment thyroid medicine still complaining of some fatigue does not feel much better also is complaining of just soreness everywhere she is on duloxetine 30 mg suggest she increase to 6 mg if she derives benefit from that addition some flat mood as well.  Chronic pain due to spine among other issues.    Review of Systems   Constitutional: Positive for fatigue.   Musculoskeletal:        Spine pain as well as muscle and joint pains.   All other systems reviewed and are negative.      Objective   Vitals:    10/06/20 1440   BP: 104/64   Pulse: 80   Temp: 97.3 °F (36.3 °C)   SpO2: 98%   Weight: 67.4 kg (148 lb 9.6 oz)     Physical Exam  Vitals signs and nursing note reviewed.   Constitutional:       Appearance: Normal appearance.   HENT:      Head: Normocephalic and atraumatic.   Eyes:      Conjunctiva/sclera: Conjunctivae normal.      Pupils: Pupils are equal, round, and reactive to light.   Cardiovascular:      Rate and Rhythm: Normal rate and regular rhythm.   Pulmonary:      Effort: Pulmonary effort is normal.      Breath sounds: Normal breath sounds.   Abdominal:      General: Abdomen is flat. Bowel sounds are normal.      Palpations: Abdomen is soft.   Neurological:      General: No focal deficit present.      Mental Status: She is alert.      Comments: Unremarkable gait and station         No results found for: INR    Procedures    Assessment/Plan     Mckenzie was seen today for follow-up and hypothyroidism.    Diagnoses and all orders for this visit:    Hypothyroidism, unspecified type  -     TSH       Await pending TSH further recommendations follow should be available in 24 to 48 hours    Much of this encounter note is an electronic transcription/translation of spoken language to printed text.  The electronic  translation of spoken language may permit erroneous, or at times, nonsensical words or phrases to be inadvertently transcribed.  Although I have reviewed the note for such errors, some may still exist. If there are questions or for further clarification, please contact me.

## 2020-10-08 DIAGNOSIS — E03.9 HYPOTHYROIDISM, UNSPECIFIED TYPE: Primary | ICD-10-CM

## 2020-10-08 LAB
T3FREE SERPL-MCNC: 2.9 PG/ML (ref 2–4.4)
T4 FREE SERPL-MCNC: 1.35 NG/DL (ref 0.93–1.7)

## 2020-10-23 ENCOUNTER — TELEPHONE (OUTPATIENT)
Dept: FAMILY MEDICINE CLINIC | Facility: CLINIC | Age: 60
End: 2020-10-23

## 2020-10-23 RX ORDER — DULOXETIN HYDROCHLORIDE 60 MG/1
60 CAPSULE, DELAYED RELEASE ORAL DAILY
Qty: 90 CAPSULE | Refills: 1 | Status: SHIPPED | OUTPATIENT
Start: 2020-10-23 | End: 2021-04-19 | Stop reason: SDUPTHER

## 2020-10-23 NOTE — TELEPHONE ENCOUNTER
You increased her to 60 mg. She was on 30 mg you told her to take 2 pills. She is out needs new rx for 60 mg

## 2020-10-23 NOTE — TELEPHONE ENCOUNTER
Needs new pres for duloxetine 60 mg 1 a day.    Her dosage was changed last time she spoke with dr Delacruz. Just needs the new pres sent.       Send to last on file

## 2020-11-02 RX ORDER — LEVOTHYROXINE SODIUM 0.05 MG/1
50 TABLET ORAL DAILY
Qty: 30 TABLET | Refills: 3 | Status: SHIPPED | OUTPATIENT
Start: 2020-11-02 | End: 2021-03-01

## 2020-12-23 DIAGNOSIS — F41.9 ANXIETY: ICD-10-CM

## 2020-12-23 DIAGNOSIS — R19.7 DIARRHEA, UNSPECIFIED TYPE: ICD-10-CM

## 2020-12-23 DIAGNOSIS — E78.5 DYSLIPIDEMIA: ICD-10-CM

## 2020-12-23 RX ORDER — GABAPENTIN 300 MG/1
CAPSULE ORAL
Qty: 90 CAPSULE | Refills: 3 | Status: SHIPPED | OUTPATIENT
Start: 2020-12-23 | End: 2021-05-17

## 2021-02-01 ENCOUNTER — OFFICE VISIT (OUTPATIENT)
Dept: FAMILY MEDICINE CLINIC | Facility: CLINIC | Age: 61
End: 2021-02-01

## 2021-02-01 VITALS
HEIGHT: 64 IN | BODY MASS INDEX: 25.78 KG/M2 | TEMPERATURE: 97.1 F | RESPIRATION RATE: 20 BRPM | SYSTOLIC BLOOD PRESSURE: 112 MMHG | WEIGHT: 151 LBS | HEART RATE: 77 BPM | OXYGEN SATURATION: 98 % | DIASTOLIC BLOOD PRESSURE: 68 MMHG

## 2021-02-01 DIAGNOSIS — M54.2 NECK PAIN: Primary | ICD-10-CM

## 2021-02-01 PROCEDURE — 99213 OFFICE O/P EST LOW 20 MIN: CPT | Performed by: FAMILY MEDICINE

## 2021-02-01 RX ORDER — CYCLOBENZAPRINE HCL 5 MG
5 TABLET ORAL 3 TIMES DAILY PRN
Qty: 30 TABLET | Refills: 1 | Status: SHIPPED | OUTPATIENT
Start: 2021-02-01 | End: 2021-03-14

## 2021-02-01 NOTE — PROGRESS NOTES
"SUBJECTIVE:  The patient is a 60-year-old female.  She presents today with neck pain.  She has chronic pain and fibromyalgia.  Her symptoms started about a week ago.  She does not recall any injury.  She does lift some at work.  No numbness or tingling down her arm.    PAST MEDICAL HISTORY:  Reviewed.    REVIEW OF SYSTEMS:  Please see above.  All others reviewed and are negative.      OBJECTIVE:   /68 (BP Location: Left arm, Patient Position: Sitting)   Pulse 77   Temp 97.1 °F (36.2 °C) (Infrared)   Resp 20   Ht 162.6 cm (64\")   Wt 68.5 kg (151 lb)   SpO2 98%   BMI 25.92 kg/m²    Vitals signs are reviewed and are stable.    General:  Well-nourished.  Alert and oriented x3 in no acute distress.  HEENT: PERRLA.   Neck: Patient has pain with lateral movement to the right and the left.  This is present more so on the right.  Range of motion is limited to the right.  She has full flexion and extension however this is uncomfortable for her  Lungs:  Clear.    Heart:  Regular rate and rhythm.   Abdomen:   Soft, nontender.   Extremities:  No cyanosis, clubbing or edema.   Neurological:  Grossly intact without motor or sensory deficits.     ASSESSMENT:      Diagnoses and all orders for this visit:    1. Neck pain (Primary)    Other orders  -     cyclobenzaprine (FLEXERIL) 5 MG tablet; Take 1 tablet by mouth 3 (Three) Times a Day As Needed for Muscle Spasms.  Dispense: 30 tablet; Refill: 1         PLAN: See above.  Ice/heat.  I advised the patient not to lift at work but she said she has to work.  She changes her mind she will let me know.  Follow-up in 2 to 3 days if not improved.    Dictated utilizing Dragon dictation.    "

## 2021-03-01 RX ORDER — LEVOTHYROXINE SODIUM 0.05 MG/1
50 TABLET ORAL DAILY
Qty: 30 TABLET | Refills: 3 | Status: SHIPPED | OUTPATIENT
Start: 2021-03-01 | End: 2021-06-29

## 2021-03-14 RX ORDER — CYCLOBENZAPRINE HCL 5 MG
TABLET ORAL
Qty: 30 TABLET | Refills: 1 | Status: SHIPPED | OUTPATIENT
Start: 2021-03-14 | End: 2021-05-02

## 2021-03-24 ENCOUNTER — BULK ORDERING (OUTPATIENT)
Dept: CASE MANAGEMENT | Facility: OTHER | Age: 61
End: 2021-03-24

## 2021-03-24 DIAGNOSIS — Z23 IMMUNIZATION DUE: ICD-10-CM

## 2021-04-21 RX ORDER — DULOXETIN HYDROCHLORIDE 60 MG/1
60 CAPSULE, DELAYED RELEASE ORAL DAILY
Qty: 90 CAPSULE | Refills: 1 | Status: SHIPPED | OUTPATIENT
Start: 2021-04-21 | End: 2021-10-25 | Stop reason: SDUPTHER

## 2021-05-02 RX ORDER — CYCLOBENZAPRINE HCL 5 MG
TABLET ORAL
Qty: 30 TABLET | Refills: 1 | Status: SHIPPED | OUTPATIENT
Start: 2021-05-02 | End: 2021-06-25

## 2021-05-16 DIAGNOSIS — F41.9 ANXIETY: ICD-10-CM

## 2021-05-16 DIAGNOSIS — R19.7 DIARRHEA, UNSPECIFIED TYPE: ICD-10-CM

## 2021-05-16 DIAGNOSIS — E78.5 DYSLIPIDEMIA: ICD-10-CM

## 2021-05-17 RX ORDER — GABAPENTIN 300 MG/1
CAPSULE ORAL
Qty: 90 CAPSULE | Refills: 1 | Status: SHIPPED | OUTPATIENT
Start: 2021-05-17 | End: 2021-10-16

## 2021-06-02 RX ORDER — MONTELUKAST SODIUM 10 MG/1
TABLET ORAL
Qty: 30 TABLET | Refills: 11 | Status: SHIPPED | OUTPATIENT
Start: 2021-06-02 | End: 2021-10-21 | Stop reason: SDUPTHER

## 2021-06-25 RX ORDER — CYCLOBENZAPRINE HCL 5 MG
TABLET ORAL
Qty: 30 TABLET | Refills: 4 | Status: SHIPPED | OUTPATIENT
Start: 2021-06-25 | End: 2021-11-02

## 2021-06-29 RX ORDER — LEVOTHYROXINE SODIUM 0.05 MG/1
50 TABLET ORAL DAILY
Qty: 30 TABLET | Refills: 3 | Status: SHIPPED | OUTPATIENT
Start: 2021-06-29 | End: 2021-10-21 | Stop reason: SDUPTHER

## 2021-10-13 DIAGNOSIS — E78.5 DYSLIPIDEMIA: ICD-10-CM

## 2021-10-13 DIAGNOSIS — F41.9 ANXIETY: ICD-10-CM

## 2021-10-13 DIAGNOSIS — R19.7 DIARRHEA, UNSPECIFIED TYPE: ICD-10-CM

## 2021-10-16 RX ORDER — GABAPENTIN 300 MG/1
CAPSULE ORAL
Qty: 90 CAPSULE | Refills: 0 | Status: SHIPPED | OUTPATIENT
Start: 2021-10-16 | End: 2021-10-21 | Stop reason: SDUPTHER

## 2021-10-20 ENCOUNTER — TELEPHONE (OUTPATIENT)
Dept: FAMILY MEDICINE CLINIC | Facility: CLINIC | Age: 61
End: 2021-10-20

## 2021-10-20 NOTE — TELEPHONE ENCOUNTER
She has or has not been taking Singulair?.  If not can start Singulair.  If taking Singulair then stop it and try Flonase and/or an antihistamine if she has not tried that.

## 2021-10-20 NOTE — TELEPHONE ENCOUNTER
Caller: Mckenzie Kinsey    Relationship: Self    Best call back number:[phone]  What is the best time to reach you: ANY    Who are you requesting to speak with (clinical staff, provider,  specific staff member): CLINICAL    What was the call regarding: PATIENT IS CALLING AND STATES THAT SHE HASN'T BEEN ON THE montelukast (Singulair) 10 MG tablet FOR A FEW WEEKS AND SHE IS FEELING FOGGY, LACK OF ENERGY AND NASAL CONGESTION.  PLEASE CONTACT PATIENT TO ADVISE.  PATIENT HAS TAKEN A COVID TEST AND IT CAME BACK NEG.    Do you require a callback: YES

## 2021-10-21 DIAGNOSIS — R19.7 DIARRHEA, UNSPECIFIED TYPE: ICD-10-CM

## 2021-10-21 DIAGNOSIS — F41.9 ANXIETY: ICD-10-CM

## 2021-10-21 DIAGNOSIS — E78.5 DYSLIPIDEMIA: ICD-10-CM

## 2021-10-21 RX ORDER — MONTELUKAST SODIUM 10 MG/1
TABLET ORAL
Qty: 30 TABLET | Refills: 11 | Status: SHIPPED | OUTPATIENT
Start: 2021-10-21 | End: 2022-10-11

## 2021-10-21 RX ORDER — LEVOTHYROXINE SODIUM 0.05 MG/1
50 TABLET ORAL DAILY
Qty: 30 TABLET | Refills: 3 | Status: SHIPPED | OUTPATIENT
Start: 2021-10-21 | End: 2022-02-16

## 2021-10-21 RX ORDER — GABAPENTIN 300 MG/1
300 CAPSULE ORAL 3 TIMES DAILY
Qty: 90 CAPSULE | Refills: 3 | Status: SHIPPED | OUTPATIENT
Start: 2021-10-21 | End: 2021-12-09 | Stop reason: SDUPTHER

## 2021-10-21 RX ORDER — MONTELUKAST SODIUM 10 MG/1
TABLET ORAL
Qty: 30 TABLET | Refills: 11 | Status: CANCELLED | OUTPATIENT
Start: 2021-10-21

## 2021-10-21 RX ORDER — MONTELUKAST SODIUM 10 MG/1
TABLET ORAL
Qty: 30 TABLET | Refills: 11 | Status: SHIPPED | OUTPATIENT
Start: 2021-10-21 | End: 2021-10-21 | Stop reason: SDUPTHER

## 2021-10-21 NOTE — TELEPHONE ENCOUNTER
Caller: Mckenzie Kinsey    Relationship: Self      Requested Prescriptions:   Requested Prescriptions     Pending Prescriptions Disp Refills   • gabapentin (NEURONTIN) 300 MG capsule 90 capsule 0     Sig: Take 1 capsule by mouth 3 (Three) Times a Day.   • montelukast (Singulair) 10 MG tablet 30 tablet 11     Sig: One PO daily   • levothyroxine (SYNTHROID, LEVOTHROID) 50 MCG tablet 30 tablet 3     Sig: Take 1 tablet by mouth Daily.        Pharmacy where request should be sent: [facility] #[id number] [address]  AT [facility] [address] - [phone]  - [phone]        Additional details provided by patient: PATIENT REQUESTING REFILL ON THESE PRESCRIPTION. PLEASE ADVISE THANK YOU!    Best call back number: [phone]    Does the patient have less than a 3 day supply:  [x] Yes  [] No    Wade Kiser Rep   10/21/21 08:57 EDT

## 2021-10-25 RX ORDER — DULOXETIN HYDROCHLORIDE 60 MG/1
60 CAPSULE, DELAYED RELEASE ORAL DAILY
Qty: 90 CAPSULE | Refills: 1 | Status: SHIPPED | OUTPATIENT
Start: 2021-10-25 | End: 2021-10-26

## 2021-10-25 NOTE — TELEPHONE ENCOUNTER
Caller: Mckenzie Kinsey    Relationship: Self      Requested Prescriptions:   Requested Prescriptions     Pending Prescriptions Disp Refills   • DULoxetine (CYMBALTA) 60 MG capsule 90 capsule 1     Sig: Take 1 capsule by mouth Daily.        Pharmacy where request should be sent: [facility] #[id number] [place], [place] - [address] AT [facility] [address] - [phone]  - [phone]    Additional details provided by patient: PATIENT IS COMPLETELY OUT. SHE SAID SHE PUT IN A REQUEST FOR THIS REFILL LAST NIGHT BUT THE PHARMACY HAS NOT RECEIVED THE AUTHORIZATION YET.     Best call back number: [phone]     Does the patient have less than a 3 day supply:  [x] Yes  [] No    Wade ELLISON Rep   10/25/21 14:41 EDT

## 2021-10-26 RX ORDER — DULOXETIN HYDROCHLORIDE 60 MG/1
60 CAPSULE, DELAYED RELEASE ORAL DAILY
Qty: 90 CAPSULE | Refills: 1 | Status: SHIPPED | OUTPATIENT
Start: 2021-10-26 | End: 2022-04-18

## 2021-11-02 RX ORDER — CYCLOBENZAPRINE HCL 5 MG
TABLET ORAL
Qty: 30 TABLET | Refills: 0 | Status: SHIPPED | OUTPATIENT
Start: 2021-11-02 | End: 2021-11-30

## 2021-11-30 RX ORDER — CYCLOBENZAPRINE HCL 5 MG
TABLET ORAL
Qty: 30 TABLET | Refills: 0 | Status: SHIPPED | OUTPATIENT
Start: 2021-11-30 | End: 2021-12-09 | Stop reason: SDUPTHER

## 2021-12-03 DIAGNOSIS — E78.5 DYSLIPIDEMIA: ICD-10-CM

## 2021-12-03 DIAGNOSIS — F41.9 ANXIETY: ICD-10-CM

## 2021-12-03 DIAGNOSIS — R19.7 DIARRHEA, UNSPECIFIED TYPE: ICD-10-CM

## 2021-12-03 RX ORDER — GABAPENTIN 300 MG/1
300 CAPSULE ORAL 3 TIMES DAILY
Qty: 90 CAPSULE | Refills: 3 | Status: CANCELLED | OUTPATIENT
Start: 2021-12-03

## 2021-12-09 ENCOUNTER — OFFICE VISIT (OUTPATIENT)
Dept: FAMILY MEDICINE CLINIC | Facility: CLINIC | Age: 61
End: 2021-12-09

## 2021-12-09 VITALS
HEIGHT: 64 IN | OXYGEN SATURATION: 97 % | HEART RATE: 87 BPM | BODY MASS INDEX: 24.92 KG/M2 | SYSTOLIC BLOOD PRESSURE: 110 MMHG | WEIGHT: 146 LBS | DIASTOLIC BLOOD PRESSURE: 70 MMHG | TEMPERATURE: 97.7 F

## 2021-12-09 DIAGNOSIS — E78.5 DYSLIPIDEMIA: ICD-10-CM

## 2021-12-09 DIAGNOSIS — M79.7 FIBROMYALGIA: Primary | ICD-10-CM

## 2021-12-09 DIAGNOSIS — M25.561 CHRONIC PAIN OF RIGHT KNEE: ICD-10-CM

## 2021-12-09 DIAGNOSIS — G89.29 CHRONIC PAIN OF RIGHT KNEE: ICD-10-CM

## 2021-12-09 PROCEDURE — 99213 OFFICE O/P EST LOW 20 MIN: CPT | Performed by: INTERNAL MEDICINE

## 2021-12-09 RX ORDER — CYCLOBENZAPRINE HCL 5 MG
5 TABLET ORAL 2 TIMES DAILY PRN
Qty: 60 TABLET | Refills: 3 | Status: SHIPPED | OUTPATIENT
Start: 2021-12-09 | End: 2022-06-09

## 2021-12-09 RX ORDER — GABAPENTIN 300 MG/1
300 CAPSULE ORAL 3 TIMES DAILY
Qty: 90 CAPSULE | Refills: 3 | Status: SHIPPED | OUTPATIENT
Start: 2021-12-09 | End: 2022-06-16 | Stop reason: SDUPTHER

## 2021-12-09 NOTE — PROGRESS NOTES
Barbara Kinsey is a 61 y.o. female.     Vitals:    12/09/21 1652   BP: 110/70   Pulse: 87   Temp: 97.7 °F (36.5 °C)   SpO2: 97%      Body mass index is 25.06 kg/m².     History of Present Illness   Patient was seen for fibromyalgia.  Patient is using gabapentin and duloxetine for treatment.  Patient was given an order for massage therapy.  Patient has chronic right knee pain is getting a knee x-ray.  Patient's lipids are being treated with diet exercise.    Dictated utilizing Dragon dictation. If there are questions or for further clarification, please contact me.  The following portions of the patient's history were reviewed and updated as appropriate: allergies, current medications, past family history, past medical history, past social history, past surgical history and problem list.    Review of Systems   Constitutional: Negative for fatigue and fever.   HENT: Positive for congestion. Negative for trouble swallowing.    Eyes: Negative for discharge and visual disturbance.   Respiratory: Negative for choking and shortness of breath.    Cardiovascular: Negative for chest pain and palpitations.   Gastrointestinal: Negative for abdominal pain and blood in stool.   Endocrine: Negative.    Genitourinary: Negative for genital sores and hematuria.   Musculoskeletal: Positive for arthralgias, joint swelling and myalgias. Negative for gait problem.   Skin: Negative for color change, pallor, rash and wound.   Allergic/Immunologic: Positive for environmental allergies. Negative for immunocompromised state.   Neurological: Negative for facial asymmetry and speech difficulty.   Psychiatric/Behavioral: Negative for hallucinations and suicidal ideas.       Objective   Physical Exam  Vitals and nursing note reviewed.   Constitutional:       Appearance: Normal appearance. She is well-developed.   HENT:      Head: Normocephalic and atraumatic.      Nose: Nose normal.      Mouth/Throat:      Mouth: Mucous membranes  are moist.      Pharynx: Oropharynx is clear.   Eyes:      Extraocular Movements: Extraocular movements intact.      Conjunctiva/sclera: Conjunctivae normal.      Pupils: Pupils are equal, round, and reactive to light.   Cardiovascular:      Rate and Rhythm: Normal rate and regular rhythm.      Heart sounds: Normal heart sounds. No murmur heard.  No friction rub. No gallop.    Pulmonary:      Effort: Pulmonary effort is normal. No respiratory distress.      Breath sounds: Normal breath sounds. No stridor. No wheezing, rhonchi or rales.   Chest:      Chest wall: No tenderness.   Abdominal:      General: Bowel sounds are normal.      Palpations: Abdomen is soft.   Musculoskeletal:         General: Tenderness present. Normal range of motion.      Cervical back: Normal range of motion and neck supple.   Skin:     General: Skin is warm and dry.   Neurological:      General: No focal deficit present.      Mental Status: She is alert and oriented to person, place, and time. Mental status is at baseline.   Psychiatric:         Mood and Affect: Mood normal.         Behavior: Behavior normal.         Thought Content: Thought content normal.         Judgment: Judgment normal.         Assessment/Plan #1 massage therapy #2 x-ray right knee  Problems Addressed this Visit     None      Visit Diagnoses     Fibromyalgia    -  Primary    Relevant Orders    Ambulatory Referral to Physical Therapy    Dyslipidemia        Relevant Medications    gabapentin (NEURONTIN) 300 MG capsule    Chronic pain of right knee        Relevant Orders    XR Knee 1 or 2 View Right      Diagnoses     Diagnosis Codes Comments    Fibromyalgia    -  Primary ICD-10-CM: M79.7  ICD-9-CM: 729.1     Dyslipidemia     ICD-10-CM: E78.5  ICD-9-CM: 272.4     Chronic pain of right knee     ICD-10-CM: M25.561, G89.29  ICD-9-CM: 719.46, 338.29

## 2022-01-03 ENCOUNTER — TELEPHONE (OUTPATIENT)
Dept: FAMILY MEDICINE CLINIC | Facility: CLINIC | Age: 62
End: 2022-01-03

## 2022-01-03 ENCOUNTER — HOSPITAL ENCOUNTER (EMERGENCY)
Facility: HOSPITAL | Age: 62
Discharge: HOME | End: 2022-01-03
Attending: EMERGENCY MEDICINE | Admitting: EMERGENCY MEDICINE

## 2022-01-03 ENCOUNTER — APPOINTMENT (OUTPATIENT)
Dept: GENERAL RADIOLOGY | Facility: HOSPITAL | Age: 62
End: 2022-01-03

## 2022-01-03 VITALS
HEART RATE: 89 BPM | TEMPERATURE: 98.4 F | RESPIRATION RATE: 16 BRPM | SYSTOLIC BLOOD PRESSURE: 149 MMHG | WEIGHT: 146 LBS | DIASTOLIC BLOOD PRESSURE: 71 MMHG | BODY MASS INDEX: 24.92 KG/M2 | OXYGEN SATURATION: 98 % | HEIGHT: 64 IN

## 2022-01-03 DIAGNOSIS — M25.859 FEMORAL ACETABULAR IMPINGEMENT: Primary | ICD-10-CM

## 2022-01-03 PROCEDURE — 72100 X-RAY EXAM L-S SPINE 2/3 VWS: CPT

## 2022-01-03 PROCEDURE — 99282 EMERGENCY DEPT VISIT SF MDM: CPT

## 2022-01-03 PROCEDURE — 73502 X-RAY EXAM HIP UNI 2-3 VIEWS: CPT

## 2022-01-03 PROCEDURE — 99282 EMERGENCY DEPT VISIT SF MDM: CPT | Performed by: EMERGENCY MEDICINE

## 2022-01-03 RX ORDER — TRAMADOL HYDROCHLORIDE 50 MG/1
50 TABLET ORAL EVERY 6 HOURS PRN
Qty: 30 TABLET | Refills: 0 | Status: SHIPPED | OUTPATIENT
Start: 2022-01-03 | End: 2022-06-16

## 2022-01-03 NOTE — TELEPHONE ENCOUNTER
Caller: Mckenzie Kinsey    Relationship: Self    Best call back number: [phone]    What orders are you requesting (i.e. lab or imaging): XRAY OF RIGHT HIP     In what timeframe would the patient need to come in: ASAP    Additional notes: ALSO EXPOSED TO COVID AND WOULD LIKE ORDERS FOR A COVID TEST ALSO PLEASE CALL AND ADVISE

## 2022-01-03 NOTE — DISCHARGE INSTRUCTIONS
Take the Ultram as needed as directed for pain.  Call Dr. Ramos in the morning for a follow-up appointment within 1 week.  Return to the emergency department if there is increased pain, numbness, tingling, weakness, change in color or temperature, worse in any way at all
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
## 2022-01-10 ENCOUNTER — OFFICE VISIT (OUTPATIENT)
Dept: ORTHOPEDIC SURGERY | Facility: CLINIC | Age: 62
End: 2022-01-10

## 2022-01-10 ENCOUNTER — PATIENT ROUNDING (BHMG ONLY) (OUTPATIENT)
Dept: ORTHOPEDIC SURGERY | Facility: CLINIC | Age: 62
End: 2022-01-10

## 2022-01-10 VITALS — RESPIRATION RATE: 18 BRPM | WEIGHT: 147 LBS | HEIGHT: 64 IN | TEMPERATURE: 98 F | BODY MASS INDEX: 25.1 KG/M2

## 2022-01-10 DIAGNOSIS — M16.11 PRIMARY OSTEOARTHRITIS OF RIGHT HIP: Primary | ICD-10-CM

## 2022-01-10 DIAGNOSIS — M25.859 FEMORAL ACETABULAR IMPINGEMENT: ICD-10-CM

## 2022-01-10 PROCEDURE — 99203 OFFICE O/P NEW LOW 30 MIN: CPT | Performed by: ORTHOPAEDIC SURGERY

## 2022-01-10 NOTE — PROGRESS NOTES
General Exam    Patient: Mckenzie Kinsey    YOB: 1960    Medical Record Number: [id number]    Chief Complaints: Right hip pain    History of Present Illness:     61 y.o. female patient who presents for retreatment right hip pain/femoral shaft impingement.  Patient was seen about a week ago with emergency department for groin pain they diagnosed her with femoral type impingement.  Patient states she had symptoms on and off for some time discussed significantly worse about a week ago but today she is doing quite well almost canceled her appointment.  States she has some achy pain about her hip and groin sometimes more sharp in nature.  Prolonged sitting she has some stiffness but when she gets loosened up feels much better.  Radiation of pain down the anterior part of her thigh.  No recent trauma.  Patient is quite active works for [employer].     Denies any numbness or tingling.  Denies any fevers, cough or shortness of breath.    Allergies: No Known Allergies    Home Medications:      Current Outpatient Medications:   •  acetaminophen (TYLENOL) 500 MG tablet, Take 500 mg by mouth Every 6 (Six) Hours As Needed for Mild Pain ., Disp: , Rfl:   •  ALBUTEROL SULFATE  (90 Base) MCG/ACT inhaler, INHALE 2 PUFFS BY MOUTH EVERY 4 HOURS AS NEEDED FOR WHEEZING, Disp: 42.5 g, Rfl: 3  •  cyclobenzaprine (FLEXERIL) 5 MG tablet, Take 1 tablet by mouth 2 (Two) Times a Day As Needed for Muscle Spasms. for muscle spams, Disp: 60 tablet, Rfl: 3  •  diclofenac (VOLTAREN) 1 % gel gel, Apply 4 g topically to the appropriate area as directed 4 (Four) Times a Day., Disp: 100 g, Rfl: 5  •  DULoxetine (CYMBALTA) 60 MG capsule, TAKE 1 CAPSULE BY MOUTH DAILY, Disp: 90 capsule, Rfl: 1  •  gabapentin (NEURONTIN) 300 MG capsule, Take 1 capsule by mouth 3 (Three) Times a Day., Disp: 90 capsule, Rfl: 3  •  levothyroxine (SYNTHROID, LEVOTHROID) 50 MCG tablet, Take 1 tablet by mouth Daily., Disp: 30 tablet, Rfl: 3  •  montelukast  "(Singulair) 10 MG tablet, One PO daily, Disp: 30 tablet, Rfl: 11  •  traMADol (Ultram) 50 MG tablet, Take 1 tablet by mouth Every 6 (Six) Hours As Needed for Moderate Pain ., Disp: 30 tablet, Rfl: 0    Past Medical History:   Diagnosis Date   • Anxiety    • Chronic pain    • Disease of thyroid gland     HYPOTHYROIDISM   • Fibromyalgia    • Hypothyroidism    • Loose stools    • Positive colorectal cancer screening using Cologuard test        Past Surgical History:   Procedure Laterality Date   •  SECTION     • COLONOSCOPY N/A 3/19/2020    Procedure: COLONOSCOPY to cecum and TI with cold snare polypectomies;  Surgeon: Jm Choe MD;  Location: [facility] ENDOSCOPY;  Service: Gastroenterology;  Laterality: N/A;  pre- pos CG, diarrhea  post-polyps, hemorrhoids   • HYSTERECTOMY     • SPINE SURGERY     • TONSILLECTOMY         Social History     Occupational History   • Not on file   Tobacco Use   • Smoking status: Current Every Day Smoker     Packs/day: 1.00     Types: Cigarettes     Start date:    • Smokeless tobacco: Never Used   Vaping Use   • Vaping Use: Never used   Substance and Sexual Activity   • Alcohol use: Not Currently   • Drug use: Not Currently   • Sexual activity: Defer      Social History     Social History Narrative   • Not on file       Family History   Problem Relation Age of Onset   • Malig Hyperthermia Neg Hx        Review of Systems:      Constitutional: Denies fever, shaking or chills         All other pertinent positives and negatives as noted above in HPI.    Physical Exam: 61 y.o. female    Vitals:    01/10/22 1307   Resp: 18   Temp: 98 °F (36.7 °C)   Weight: 66.7 kg (147 lb)   Height: 162.6 cm (64\")       General:  Patient is awake and alert.  Appears in no acute distress or discomfort.        Musculoskeletal/Extremities:    Right lower extremity: No tenderness to palpation.  Hip range of motion is overall full painless.  Patient stable seated position overall normal gait.  " Patient does have positive FADIR test negative LEV.  Negative Stinchfield straight leg raise.  Strength appropriate sensation tact distally         Radiology:       Previous imaging was reviewed 3 views to evaluate the patient's complaint/s.    Imaging shows mild to moderate degenerative changes in the right hip joint with some signs of femoral acetabular impingement likely cam lesion and appears to have a crossover sign suspect for some acetabular retroversion.  No acute fractures noted.     No imaging for comparison.    Assessment: Right hip osteoarthritis, femoral acetabular impingement      Plan:      Discussed the findings with the patient she does have some radiographic features of femoral stem impingement however she also has some underlying arthritis and her symptoms are more in line with an arthritic type presentation.  Recommend start treatment this time consisting of active modification, anti-inflammatories, physical therapy, plus or minus injections.  Patient will try some anti-inflammatories and management as needed hold off any therapy at this time.           We will plan for follow up as needed.    All questions were answered.  Patient understands and agrees with the plan.    Keon Gant MD    01/10/2022    CC to Reyes Roy MD

## 2022-01-11 NOTE — PROGRESS NOTES
January 10, 2022    A [employer] message has been sent to the patient for PATIENT R[place] [facility] [place]

## 2022-02-16 RX ORDER — LEVOTHYROXINE SODIUM 0.05 MG/1
50 TABLET ORAL DAILY
Qty: 90 TABLET | Refills: 3 | Status: SHIPPED | OUTPATIENT
Start: 2022-02-16 | End: 2022-05-22

## 2022-02-16 RX ORDER — LEVOTHYROXINE SODIUM 0.05 MG/1
50 TABLET ORAL DAILY
Qty: 30 TABLET | Refills: 0 | Status: SHIPPED | OUTPATIENT
Start: 2022-02-16 | End: 2022-02-16 | Stop reason: SDUPTHER

## 2022-04-18 RX ORDER — DULOXETIN HYDROCHLORIDE 60 MG/1
60 CAPSULE, DELAYED RELEASE ORAL DAILY
Qty: 90 CAPSULE | Refills: 1 | Status: SHIPPED | OUTPATIENT
Start: 2022-04-18 | End: 2023-01-08

## 2022-05-22 RX ORDER — LEVOTHYROXINE SODIUM 0.05 MG/1
50 TABLET ORAL DAILY
Qty: 30 TABLET | Refills: 1 | Status: SHIPPED | OUTPATIENT
Start: 2022-05-22 | End: 2022-05-23

## 2022-05-23 RX ORDER — LEVOTHYROXINE SODIUM 0.05 MG/1
50 TABLET ORAL DAILY
Qty: 90 TABLET | Refills: 3 | Status: SHIPPED | OUTPATIENT
Start: 2022-05-23 | End: 2022-06-22

## 2022-06-09 RX ORDER — CYCLOBENZAPRINE HCL 5 MG
TABLET ORAL
Qty: 60 TABLET | Refills: 3 | Status: SHIPPED | OUTPATIENT
Start: 2022-06-09 | End: 2022-11-28

## 2022-06-16 ENCOUNTER — OFFICE VISIT (OUTPATIENT)
Dept: FAMILY MEDICINE CLINIC | Facility: CLINIC | Age: 62
End: 2022-06-16

## 2022-06-16 VITALS
DIASTOLIC BLOOD PRESSURE: 80 MMHG | SYSTOLIC BLOOD PRESSURE: 112 MMHG | BODY MASS INDEX: 25.2 KG/M2 | OXYGEN SATURATION: 93 % | HEART RATE: 80 BPM | HEIGHT: 64 IN | WEIGHT: 147.6 LBS | TEMPERATURE: 97.3 F

## 2022-06-16 DIAGNOSIS — E03.9 HYPOTHYROIDISM, UNSPECIFIED TYPE: ICD-10-CM

## 2022-06-16 DIAGNOSIS — E78.5 DYSLIPIDEMIA: ICD-10-CM

## 2022-06-16 DIAGNOSIS — M79.7 FIBROMYALGIA: Primary | ICD-10-CM

## 2022-06-16 PROBLEM — M25.859 FEMORAL ACETABULAR IMPINGEMENT: Status: ACTIVE | Noted: 2022-06-16

## 2022-06-16 PROCEDURE — 99214 OFFICE O/P EST MOD 30 MIN: CPT | Performed by: INTERNAL MEDICINE

## 2022-06-16 RX ORDER — GABAPENTIN 300 MG/1
300 CAPSULE ORAL 3 TIMES DAILY
Qty: 90 CAPSULE | Refills: 3 | Status: SHIPPED | OUTPATIENT
Start: 2022-06-16 | End: 2022-11-10

## 2022-06-16 NOTE — PROGRESS NOTES
Barbara Kinsey is a 62 y.o. female.     Vitals:    06/16/22 1019   BP: 112/80   Pulse: 80   Temp: 97.3 °F (36.3 °C)   SpO2: 93%      Body mass index is 25.34 kg/m².     History of Present Illness   Patient was seen for fibromyalgia.  Patient is having a lot of pain today and is doing her stretching routine.  Pain is partially controlled with gabapentin 300 mg 3 times daily and Voltaren gel 1% 4 g 4 times daily..  These were refilled and patient will continue stretching.  Patient did not want to go to physical therapy or have x-rays.  Patient's thyroid treated with levothyroxine 50 mcg daily.  TSH was 1.6 but that was 1 year ago.  Patient is having labs redrawn.  She will follow-up after labs.  Patient's lipids are treated with diet and exercise.  Triglycerides were 213, HDL 40, .  Patient's labs were 2 years ago.  Patient is having them redrawn.  Patient will follow-up in 4 month.    Dictated utilizing Dragon dictation. If there are questions or for further clarification, please contact me.  The following portions of the patient's history were reviewed and updated as appropriate: allergies, current medications, past family history, past medical history, past social history, past surgical history and problem list.    Review of Systems   Constitutional: Negative for fatigue and fever.   HENT: Positive for congestion. Negative for trouble swallowing.    Eyes: Negative for discharge and visual disturbance.   Respiratory: Negative for choking and shortness of breath.    Cardiovascular: Negative for chest pain and palpitations.   Gastrointestinal: Negative for abdominal pain and blood in stool.   Endocrine: Negative.    Genitourinary: Negative for genital sores and hematuria.   Musculoskeletal: Positive for arthralgias and back pain. Negative for gait problem and joint swelling.   Skin: Negative for color change, pallor, rash and wound.   Allergic/Immunologic: Positive for environmental allergies.  Negative for immunocompromised state.   Neurological: Negative for facial asymmetry and speech difficulty.   Psychiatric/Behavioral: Negative for hallucinations and suicidal ideas.       Objective   Physical Exam  Vitals and nursing note reviewed.   Constitutional:       Appearance: Normal appearance. She is well-developed.   HENT:      Head: Normocephalic and atraumatic.      Nose: Nose normal.      Mouth/Throat:      Mouth: Mucous membranes are moist.      Pharynx: Oropharynx is clear.   Eyes:      Extraocular Movements: Extraocular movements intact.      Conjunctiva/sclera: Conjunctivae normal.      Pupils: Pupils are equal, round, and reactive to light.   Cardiovascular:      Rate and Rhythm: Normal rate and regular rhythm.      Heart sounds: Normal heart sounds. No murmur heard.    No friction rub. No gallop.   Pulmonary:      Effort: Pulmonary effort is normal. No respiratory distress.      Breath sounds: Normal breath sounds. No stridor. No wheezing, rhonchi or rales.   Chest:      Chest wall: No tenderness.   Abdominal:      General: Bowel sounds are normal.      Palpations: Abdomen is soft.   Musculoskeletal:         General: Tenderness present. Normal range of motion.      Cervical back: Normal range of motion and neck supple.   Skin:     General: Skin is warm and dry.   Neurological:      General: No focal deficit present.      Mental Status: She is alert and oriented to person, place, and time. Mental status is at baseline.   Psychiatric:         Mood and Affect: Mood normal.         Behavior: Behavior normal.         Thought Content: Thought content normal.         Judgment: Judgment normal.         Assessment & Plan #1 refill meds #2 labs #3 continue stretching  Problems Addressed this Visit    None     Visit Diagnoses     Fibromyalgia    -  Primary    Relevant Orders    CBC (No Diff)    Comprehensive Metabolic Panel    Lipid Panel    Vitamin D 25 Hydroxy    Dyslipidemia        Relevant Medications     gabapentin (NEURONTIN) 300 MG capsule    Other Relevant Orders    CBC (No Diff)    Comprehensive Metabolic Panel    Lipid Panel    Vitamin D 25 Hydroxy    Hypothyroidism, unspecified type        Relevant Orders    CBC (No Diff)    Comprehensive Metabolic Panel    Lipid Panel    Vitamin D 25 Hydroxy    Thyroid Panel With TSH      Diagnoses     Diagnosis Codes Comments    Fibromyalgia    -  Primary ICD-10-CM: M79.7  ICD-9-CM: 729.1     Dyslipidemia     ICD-10-CM: E78.5  ICD-9-CM: 272.4     Hypothyroidism, unspecified type     ICD-10-CM: E03.9  ICD-9-CM: 244.9

## 2022-06-22 ENCOUNTER — LAB (OUTPATIENT)
Dept: FAMILY MEDICINE CLINIC | Facility: CLINIC | Age: 62
End: 2022-06-22

## 2022-06-22 DIAGNOSIS — E78.5 DYSLIPIDEMIA: ICD-10-CM

## 2022-06-22 DIAGNOSIS — M79.7 FIBROMYALGIA: ICD-10-CM

## 2022-06-22 DIAGNOSIS — E03.9 HYPOTHYROIDISM, UNSPECIFIED TYPE: ICD-10-CM

## 2022-06-22 LAB
25(OH)D3 SERPL-MCNC: 27.2 NG/ML (ref 30–100)
ALBUMIN SERPL-MCNC: 3.9 G/DL (ref 3.5–5.2)
ALBUMIN/GLOB SERPL: 1.4 G/DL
ALP SERPL-CCNC: 96 U/L (ref 39–117)
ALT SERPL W P-5'-P-CCNC: 12 U/L (ref 1–33)
ANION GAP SERPL CALCULATED.3IONS-SCNC: 10 MMOL/L (ref 5–15)
AST SERPL-CCNC: 20 U/L (ref 1–32)
BILIRUB SERPL-MCNC: <0.2 MG/DL (ref 0–1.2)
BUN SERPL-MCNC: 7 MG/DL (ref 8–23)
BUN/CREAT SERPL: 10.6 (ref 7–25)
CALCIUM SPEC-SCNC: 9.1 MG/DL (ref 8.6–10.5)
CHLORIDE SERPL-SCNC: 101 MMOL/L (ref 98–107)
CHOLEST SERPL-MCNC: 184 MG/DL (ref 0–200)
CO2 SERPL-SCNC: 25 MMOL/L (ref 22–29)
CREAT SERPL-MCNC: 0.66 MG/DL (ref 0.57–1)
DEPRECATED RDW RBC AUTO: 47.3 FL (ref 37–54)
EGFRCR SERPLBLD CKD-EPI 2021: 100.6 ML/MIN/1.73
ERYTHROCYTE [DISTWIDTH] IN BLOOD BY AUTOMATED COUNT: 13.9 % (ref 12.3–15.4)
GLOBULIN UR ELPH-MCNC: 2.7 GM/DL
GLUCOSE SERPL-MCNC: 97 MG/DL (ref 65–99)
HCT VFR BLD AUTO: 36.8 % (ref 34–46.6)
HDLC SERPL-MCNC: 45 MG/DL (ref 40–60)
HGB BLD-MCNC: 12.4 G/DL (ref 12–15.9)
LDLC SERPL CALC-MCNC: 120 MG/DL (ref 0–100)
LDLC/HDLC SERPL: 2.63 {RATIO}
MCH RBC QN AUTO: 31.9 PG (ref 26.6–33)
MCHC RBC AUTO-ENTMCNC: 33.7 G/DL (ref 31.5–35.7)
MCV RBC AUTO: 94.6 FL (ref 79–97)
PLATELET # BLD AUTO: 346 10*3/MM3 (ref 140–450)
PMV BLD AUTO: 9.4 FL (ref 6–12)
POTASSIUM SERPL-SCNC: 4.3 MMOL/L (ref 3.5–5.2)
PROT SERPL-MCNC: 6.6 G/DL (ref 6–8.5)
RBC # BLD AUTO: 3.89 10*6/MM3 (ref 3.77–5.28)
SODIUM SERPL-SCNC: 136 MMOL/L (ref 136–145)
T-UPTAKE NFR SERPL: 1.16 TBI (ref 0.8–1.3)
T4 SERPL-MCNC: 8.03 MCG/DL (ref 4.5–11.7)
TRIGL SERPL-MCNC: 104 MG/DL (ref 0–150)
TSH SERPL DL<=0.05 MIU/L-ACNC: 2.52 UIU/ML (ref 0.27–4.2)
VLDLC SERPL-MCNC: 19 MG/DL (ref 5–40)
WBC NRBC COR # BLD: 12.11 10*3/MM3 (ref 3.4–10.8)

## 2022-06-22 PROCEDURE — 84479 ASSAY OF THYROID (T3 OR T4): CPT | Performed by: INTERNAL MEDICINE

## 2022-06-22 PROCEDURE — 82306 VITAMIN D 25 HYDROXY: CPT | Performed by: INTERNAL MEDICINE

## 2022-06-22 PROCEDURE — 80061 LIPID PANEL: CPT | Performed by: INTERNAL MEDICINE

## 2022-06-22 PROCEDURE — 84443 ASSAY THYROID STIM HORMONE: CPT | Performed by: INTERNAL MEDICINE

## 2022-06-22 PROCEDURE — 85027 COMPLETE CBC AUTOMATED: CPT | Performed by: INTERNAL MEDICINE

## 2022-06-22 PROCEDURE — 84436 ASSAY OF TOTAL THYROXINE: CPT | Performed by: INTERNAL MEDICINE

## 2022-06-22 PROCEDURE — 80053 COMPREHEN METABOLIC PANEL: CPT | Performed by: INTERNAL MEDICINE

## 2022-06-22 PROCEDURE — 36415 COLL VENOUS BLD VENIPUNCTURE: CPT | Performed by: INTERNAL MEDICINE

## 2022-06-22 RX ORDER — LEVOTHYROXINE SODIUM 0.05 MG/1
50 TABLET ORAL DAILY
Qty: 90 TABLET | Refills: 3 | Status: SHIPPED | OUTPATIENT
Start: 2022-06-22

## 2022-06-28 ENCOUNTER — TELEPHONE (OUTPATIENT)
Dept: FAMILY MEDICINE CLINIC | Facility: CLINIC | Age: 62
End: 2022-06-28

## 2022-06-28 DIAGNOSIS — M54.40 CHRONIC LOW BACK PAIN WITH SCIATICA, SCIATICA LATERALITY UNSPECIFIED, UNSPECIFIED BACK PAIN LATERALITY: Primary | ICD-10-CM

## 2022-06-28 DIAGNOSIS — G89.29 CHRONIC LOW BACK PAIN WITH SCIATICA, SCIATICA LATERALITY UNSPECIFIED, UNSPECIFIED BACK PAIN LATERALITY: Primary | ICD-10-CM

## 2022-06-28 NOTE — TELEPHONE ENCOUNTER
Caller: Mckenzie Kinsey    Relationship: Self    Best call back number: [phone]    What orders are you requesting (i.e. lab or imaging): XRAY OF HIP/LOWER BACK    Where will you receive your lab/imaging services: [facility]    Additional notes: PATIENT HAS HAD PAIN IN LOWER BACK/HIP AREA AND WOULD LIKE TO HAVE IMAGING TO SEE WHAT MAY BE CAUSING IT

## 2022-10-11 ENCOUNTER — OFFICE VISIT (OUTPATIENT)
Dept: FAMILY MEDICINE CLINIC | Facility: CLINIC | Age: 62
End: 2022-10-11

## 2022-10-11 VITALS
TEMPERATURE: 97.7 F | DIASTOLIC BLOOD PRESSURE: 74 MMHG | BODY MASS INDEX: 25.3 KG/M2 | SYSTOLIC BLOOD PRESSURE: 122 MMHG | HEART RATE: 83 BPM | HEIGHT: 64 IN | WEIGHT: 148.2 LBS | OXYGEN SATURATION: 98 %

## 2022-10-11 DIAGNOSIS — M25.562 CHRONIC PAIN OF BOTH KNEES: Primary | ICD-10-CM

## 2022-10-11 DIAGNOSIS — G89.29 CHRONIC PAIN OF BOTH KNEES: Primary | ICD-10-CM

## 2022-10-11 DIAGNOSIS — E03.9 HYPOTHYROIDISM, UNSPECIFIED TYPE: ICD-10-CM

## 2022-10-11 DIAGNOSIS — M25.561 CHRONIC PAIN OF BOTH KNEES: Primary | ICD-10-CM

## 2022-10-11 PROCEDURE — 99213 OFFICE O/P EST LOW 20 MIN: CPT | Performed by: INTERNAL MEDICINE

## 2022-10-11 NOTE — PROGRESS NOTES
"Chief Complaint  4 month f/u    Subjective        Mckenzie Kinsey presents to [facility]  History of Present Illness patient has chronic pain in both knees.  Patient's had it for several months.  Patient is taken Voltaren gel 1% with partial relief.  Patient also uses gabapentin 300 mg 3 times daily.  Patient not want to see physical therapy or orthopedic.  She also does not want an x-ray at this time.  Patient wants to continue present treatment and follow-up in 4 months.  Patient's thyroids been treated with levothyroxine 50 mcg daily.  Patient's latest TSH was 2.5.  Patient will continue present dosage.    Dictated utilizing Dragon dictation. If there are questions or for further clarification, please contact me.    Objective   Vital Signs:  Blood Pressure 122/74   Pulse 83   Temperature 97.7 °F (36.5 °C)   Height 162.6 cm (64\")   Weight 67.2 kg (148 lb 3.2 oz)   Oxygen Saturation 98%   Body Mass Index 25.44 kg/m²   Estimated body mass index is 25.44 kg/m² as calculated from the following:    Height as of this encounter: 162.6 cm (64\").    Weight as of this encounter: 67.2 kg (148 lb 3.2 oz).          Physical Exam  Vitals and nursing note reviewed.   Constitutional:       Appearance: Normal appearance. She is well-developed.   HENT:      Head: Normocephalic and atraumatic.      Nose: Nose normal.      Mouth/Throat:      Mouth: Mucous membranes are moist.      Pharynx: Oropharynx is clear.   Eyes:      Extraocular Movements: Extraocular movements intact.      Conjunctiva/sclera: Conjunctivae normal.      Pupils: Pupils are equal, round, and reactive to light.   Cardiovascular:      Rate and Rhythm: Normal rate and regular rhythm.      Heart sounds: Normal heart sounds. No murmur heard.    No friction rub. No gallop.   Pulmonary:      Effort: Pulmonary effort is normal. No respiratory distress.      Breath sounds: Normal breath sounds. No stridor. No wheezing, rhonchi or " rales.   Chest:      Chest wall: No tenderness.   Abdominal:      General: Bowel sounds are normal.      Palpations: Abdomen is soft.   Musculoskeletal:         General: Swelling and tenderness present. Normal range of motion.      Cervical back: Normal range of motion and neck supple.   Skin:     General: Skin is warm and dry.   Neurological:      General: No focal deficit present.      Mental Status: She is alert and oriented to person, place, and time. Mental status is at baseline.   Psychiatric:         Mood and Affect: Mood normal.         Behavior: Behavior normal.         Thought Content: Thought content normal.         Judgment: Judgment normal.        Result Review :                Assessment and Plan  #1 recommend physical therapy and x-ray, orthopedic surgery referral #2 recheck labs in 4 months  Diagnoses and all orders for this visit:    1. Chronic pain of both knees (Primary)    2. Hypothyroidism, unspecified type             Follow Up   Return in about 4 months (around 2/11/2023), or if symptoms worsen or fail to improve, for Recheck.  Patient was given instructions and counseling regarding her condition or for health maintenance advice. Please see specific information pulled into the AVS if appropriate.

## 2022-10-26 RX ORDER — MONTELUKAST SODIUM 10 MG/1
TABLET ORAL
Qty: 30 TABLET | Refills: 11 | Status: SHIPPED | OUTPATIENT
Start: 2022-10-26

## 2022-11-10 DIAGNOSIS — E78.5 DYSLIPIDEMIA: ICD-10-CM

## 2022-11-10 RX ORDER — GABAPENTIN 300 MG/1
CAPSULE ORAL
Qty: 90 CAPSULE | Refills: 4 | Status: SHIPPED | OUTPATIENT
Start: 2022-11-10 | End: 2023-02-16 | Stop reason: DRUGHIGH

## 2022-11-28 RX ORDER — CYCLOBENZAPRINE HCL 5 MG
TABLET ORAL
Qty: 60 TABLET | Refills: 3 | Status: SHIPPED | OUTPATIENT
Start: 2022-11-28 | End: 2023-02-16 | Stop reason: DRUGHIGH

## 2023-01-08 RX ORDER — DULOXETIN HYDROCHLORIDE 60 MG/1
60 CAPSULE, DELAYED RELEASE ORAL DAILY
Qty: 90 CAPSULE | Refills: 1 | Status: SHIPPED | OUTPATIENT
Start: 2023-01-08

## 2023-01-30 ENCOUNTER — TELEPHONE (OUTPATIENT)
Dept: FAMILY MEDICINE CLINIC | Facility: CLINIC | Age: 63
End: 2023-01-30
Payer: COMMERCIAL

## 2023-02-06 ENCOUNTER — TELEPHONE (OUTPATIENT)
Dept: FAMILY MEDICINE CLINIC | Facility: CLINIC | Age: 63
End: 2023-02-06
Payer: COMMERCIAL

## 2023-02-06 DIAGNOSIS — E78.5 DYSLIPIDEMIA: Primary | ICD-10-CM

## 2023-02-06 DIAGNOSIS — E03.9 HYPOTHYROIDISM, UNSPECIFIED TYPE: ICD-10-CM

## 2023-02-06 DIAGNOSIS — E55.9 VITAMIN D DEFICIENCY: ICD-10-CM

## 2023-02-09 DIAGNOSIS — E78.5 DYSLIPIDEMIA: ICD-10-CM

## 2023-02-09 DIAGNOSIS — E55.9 VITAMIN D DEFICIENCY: ICD-10-CM

## 2023-02-09 DIAGNOSIS — E03.9 HYPOTHYROIDISM, UNSPECIFIED TYPE: ICD-10-CM

## 2023-02-10 LAB
25(OH)D3+25(OH)D2 SERPL-MCNC: 25.5 NG/ML (ref 30–100)
ALBUMIN SERPL-MCNC: 4.4 G/DL (ref 3.5–5.2)
ALBUMIN/GLOB SERPL: 1.8 G/DL
ALP SERPL-CCNC: 117 U/L (ref 39–117)
ALT SERPL-CCNC: 9 U/L (ref 1–33)
AST SERPL-CCNC: 16 U/L (ref 1–32)
BILIRUB SERPL-MCNC: <0.2 MG/DL (ref 0–1.2)
BUN SERPL-MCNC: 6 MG/DL (ref 8–23)
BUN/CREAT SERPL: 9 (ref 7–25)
CALCIUM SERPL-MCNC: 9.6 MG/DL (ref 8.6–10.5)
CHLORIDE SERPL-SCNC: 100 MMOL/L (ref 98–107)
CHOLEST SERPL-MCNC: 230 MG/DL (ref 0–200)
CO2 SERPL-SCNC: 25.3 MMOL/L (ref 22–29)
CREAT SERPL-MCNC: 0.67 MG/DL (ref 0.57–1)
EGFRCR SERPLBLD CKD-EPI 2021: 100.2 ML/MIN/1.73
ERYTHROCYTE [DISTWIDTH] IN BLOOD BY AUTOMATED COUNT: 12.8 % (ref 12.3–15.4)
FT4I SERPL CALC-MCNC: 2.2 (ref 1.2–4.9)
GLOBULIN SER CALC-MCNC: 2.4 GM/DL
GLUCOSE SERPL-MCNC: 76 MG/DL (ref 65–99)
HCT VFR BLD AUTO: 42.2 % (ref 34–46.6)
HDLC SERPL-MCNC: 50 MG/DL (ref 40–60)
HGB BLD-MCNC: 14.2 G/DL (ref 12–15.9)
LDLC SERPL CALC-MCNC: 158 MG/DL (ref 0–100)
MCH RBC QN AUTO: 32.1 PG (ref 26.6–33)
MCHC RBC AUTO-ENTMCNC: 33.6 G/DL (ref 31.5–35.7)
MCV RBC AUTO: 95.5 FL (ref 79–97)
PLATELET # BLD AUTO: 384 10*3/MM3 (ref 140–450)
POTASSIUM SERPL-SCNC: 4.5 MMOL/L (ref 3.5–5.2)
PROT SERPL-MCNC: 6.8 G/DL (ref 6–8.5)
RBC # BLD AUTO: 4.42 10*6/MM3 (ref 3.77–5.28)
SODIUM SERPL-SCNC: 138 MMOL/L (ref 136–145)
T3RU NFR SERPL: 23 % (ref 24–39)
T4 SERPL-MCNC: 9.4 UG/DL (ref 4.5–12)
TRIGL SERPL-MCNC: 122 MG/DL (ref 0–150)
TSH SERPL DL<=0.005 MIU/L-ACNC: 2.4 UIU/ML (ref 0.45–4.5)
VLDLC SERPL CALC-MCNC: 22 MG/DL (ref 5–40)
WBC # BLD AUTO: 10.84 10*3/MM3 (ref 3.4–10.8)

## 2023-02-16 ENCOUNTER — OFFICE VISIT (OUTPATIENT)
Dept: FAMILY MEDICINE CLINIC | Facility: CLINIC | Age: 63
End: 2023-02-16
Payer: COMMERCIAL

## 2023-02-16 VITALS
BODY MASS INDEX: 26.29 KG/M2 | HEART RATE: 70 BPM | HEIGHT: 64 IN | DIASTOLIC BLOOD PRESSURE: 68 MMHG | OXYGEN SATURATION: 96 % | TEMPERATURE: 98.6 F | SYSTOLIC BLOOD PRESSURE: 112 MMHG | WEIGHT: 154 LBS

## 2023-02-16 DIAGNOSIS — G89.29 CHRONIC PAIN OF BOTH KNEES: ICD-10-CM

## 2023-02-16 DIAGNOSIS — E03.9 HYPOTHYROIDISM, UNSPECIFIED TYPE: ICD-10-CM

## 2023-02-16 DIAGNOSIS — M25.562 CHRONIC PAIN OF BOTH KNEES: ICD-10-CM

## 2023-02-16 DIAGNOSIS — M25.561 CHRONIC PAIN OF BOTH KNEES: ICD-10-CM

## 2023-02-16 DIAGNOSIS — E78.2 MIXED HYPERLIPIDEMIA: ICD-10-CM

## 2023-02-16 DIAGNOSIS — M25.559 HIP PAIN: ICD-10-CM

## 2023-02-16 DIAGNOSIS — M54.40 MIDLINE LOW BACK PAIN WITH SCIATICA, SCIATICA LATERALITY UNSPECIFIED, UNSPECIFIED CHRONICITY: Primary | ICD-10-CM

## 2023-02-16 PROCEDURE — 99214 OFFICE O/P EST MOD 30 MIN: CPT | Performed by: INTERNAL MEDICINE

## 2023-02-16 RX ORDER — GABAPENTIN 600 MG/1
600 TABLET ORAL 3 TIMES DAILY
Qty: 90 TABLET | Refills: 3 | Status: SHIPPED | OUTPATIENT
Start: 2023-02-16

## 2023-02-16 RX ORDER — CYCLOBENZAPRINE HCL 10 MG
10 TABLET ORAL 3 TIMES DAILY PRN
Qty: 90 TABLET | Refills: 3 | Status: SHIPPED | OUTPATIENT
Start: 2023-02-16

## 2023-02-16 NOTE — PROGRESS NOTES
"Chief Complaint  4 month f/u and go over labs    Subjective        Mckenzie SUZETTE Kinsey presents to [facility]  History of Present Illness patient was seen for low back and knee pain.  Patient has severe pain in the lower back and right hip.  Patient also has severe pain in her knees.  Patient was put on Flexeril 10 mg 3 times daily as needed and gabapentin was increased from 300 to 600 mg 3 times daily.  Patient is also using Voltaren 1% gel with partial relief.  Patient is having x-rays of her L-spine, right knee, right hip.  Patient was also given prescription for physical therapy.  Patient will follow-up in 2 months.  Patient's lipids treated with diet exercise.  Patient's LDL was 150 and she wants to try 6 more months.  And was placed on a DASH diet and will begin exercising.  Patient's thyroids been treated with levothyroxine 50 mcg daily.  TSH was 2.4.  Patient will continue present treatment.    Dictated utilizing Dragon dictation. If there are questions or for further clarification, please contact me.    Objective   Vital Signs:  Blood Pressure 112/68   Pulse 70   Temperature 98.6 °F (37 °C)   Height 162.6 cm (64\")   Weight 69.9 kg (154 lb)   Oxygen Saturation 96%   Body Mass Index 26.43 kg/m²   Estimated body mass index is 26.43 kg/m² as calculated from the following:    Height as of this encounter: 162.6 cm (64\").    Weight as of this encounter: 69.9 kg (154 lb).             Physical Exam  Vitals and nursing note reviewed.   Constitutional:       Appearance: Normal appearance. She is well-developed.   HENT:      Head: Normocephalic and atraumatic.      Nose: Nose normal.      Mouth/Throat:      Mouth: Mucous membranes are moist.      Pharynx: Oropharynx is clear.   Eyes:      Extraocular Movements: Extraocular movements intact.      Conjunctiva/sclera: Conjunctivae normal.      Pupils: Pupils are equal, round, and reactive to light.   Cardiovascular:      Rate and " Rhythm: Normal rate and regular rhythm.      Heart sounds: Normal heart sounds. No murmur heard.    No friction rub. No gallop.   Pulmonary:      Effort: Pulmonary effort is normal. No respiratory distress.      Breath sounds: Normal breath sounds. No stridor. No wheezing, rhonchi or rales.   Chest:      Chest wall: No tenderness.   Abdominal:      General: Bowel sounds are normal.      Palpations: Abdomen is soft.   Musculoskeletal:         General: Swelling, tenderness and deformity present. Normal range of motion.      Cervical back: Normal range of motion and neck supple.   Skin:     General: Skin is warm and dry.   Neurological:      General: No focal deficit present.      Mental Status: She is alert and oriented to person, place, and time. Mental status is at baseline.      Motor: Weakness present.      Gait: Gait abnormal.   Psychiatric:         Mood and Affect: Mood normal.         Behavior: Behavior normal.         Thought Content: Thought content normal.         Judgment: Judgment normal.        Result Review :                   Assessment and Plan   1 physical therapy, crease gabapentin 600 mg 3 times daily, Flexeril 10 mg 3 times daily.  #2 Dash diet low impact exercise  Diagnoses and all orders for this visit:    1. Midline low back pain with sciatica, sciatica laterality unspecified, unspecified chronicity (Primary)  -     XR Spine Lumbar 2 or 3 View; Future  -     Ambulatory Referral to Physical Therapy Evaluate and treat, Ortho  -     XR Spine Lumbar 2 or 3 View; Future    2. Hypothyroidism, unspecified type    3. Mixed hyperlipidemia    4. Chronic pain of both knees    5. Hip pain  -     XR Hip With or Without Pelvis 2 - 3 View Right; Future  -     Ambulatory Referral to Physical Therapy Evaluate and treat, Ortho    Other orders  -     gabapentin (NEURONTIN) 600 MG tablet; Take 1 tablet by mouth 3 (Three) Times a Day.  Dispense: 90 tablet; Refill: 3  -     cyclobenzaprine (FLEXERIL) 10 MG tablet;  Take 1 tablet by mouth 3 (Three) Times a Day As Needed for Muscle Spasms (Do not drive).  Dispense: 90 tablet; Refill: 3             Follow Up   Return in about 3 months (around 5/16/2023), or if symptoms worsen or fail to improve, for Recheck.  Patient was given instructions and counseling regarding her condition or for health maintenance advice. Please see specific information pulled into the AVS if appropriate.

## 2024-02-20 ENCOUNTER — TRANSCRIBE ORDERS (OUTPATIENT)
Dept: ADMINISTRATIVE | Facility: HOSPITAL | Age: 64
End: 2024-02-20
Payer: COMMERCIAL

## 2024-02-20 DIAGNOSIS — M54.50 LOW BACK PAIN, UNSPECIFIED BACK PAIN LATERALITY, UNSPECIFIED CHRONICITY, UNSPECIFIED WHETHER SCIATICA PRESENT: Primary | ICD-10-CM

## (undated) DEVICE — LN SMPL CO2 SHTRM SD STREAM W/M LUER

## (undated) DEVICE — THE SINGLE USE ETRAP – POLYP TRAP IS USED FOR SUCTION RETRIEVAL OF ENDOSCOPICALLY REMOVED POLYPS.: Brand: ETRAP

## (undated) DEVICE — SENSR O2 OXIMAX FNGR A/ 18IN NONSTR

## (undated) DEVICE — THE TORRENT IRRIGATION SCOPE CONNECTOR IS USED WITH THE TORRENT IRRIGATION TUBING TO PROVIDE IRRIGATION FLUIDS SUCH AS STERILE WATER DURING GASTROINTESTINAL ENDOSCOPIC PROCEDURES WHEN USED IN CONJUNCTION WITH AN IRRIGATION PUMP (OR ELECTROSURGICAL UNIT).: Brand: TORRENT

## (undated) DEVICE — CANN O2 ETCO2 FITS ALL CONN CO2 SMPL A/ 7IN DISP LF

## (undated) DEVICE — SNAR POLYP SENSATION STDOVL 27 240 BX40

## (undated) DEVICE — TUBING, SUCTION, 1/4" X 10', STRAIGHT: Brand: MEDLINE

## (undated) DEVICE — ADAPT CLN BIOGUARD AIR/H2O DISP

## (undated) DEVICE — KT ORCA ORCAPOD DISP STRL